# Patient Record
Sex: FEMALE | Race: WHITE | Employment: FULL TIME | URBAN - NONMETROPOLITAN AREA
[De-identification: names, ages, dates, MRNs, and addresses within clinical notes are randomized per-mention and may not be internally consistent; named-entity substitution may affect disease eponyms.]

---

## 2019-04-04 VITALS
DIASTOLIC BLOOD PRESSURE: 82 MMHG | WEIGHT: 163 LBS | SYSTOLIC BLOOD PRESSURE: 136 MMHG | HEIGHT: 64 IN | TEMPERATURE: 96.9 F | BODY MASS INDEX: 27.83 KG/M2

## 2019-04-04 RX ORDER — LANSOPRAZOLE 30 MG/1
1 CAPSULE, DELAYED RELEASE ORAL DAILY
COMMUNITY
Start: 2016-11-29 | End: 2020-07-13 | Stop reason: SDUPTHER

## 2019-04-04 SDOH — HEALTH STABILITY: MENTAL HEALTH: HOW OFTEN DO YOU HAVE A DRINK CONTAINING ALCOHOL?: NEVER

## 2019-09-16 RX ORDER — LANSOPRAZOLE 15 MG/1
15 CAPSULE, DELAYED RELEASE ORAL DAILY
Qty: 90 CAPSULE | Refills: 2 | Status: SHIPPED | OUTPATIENT
Start: 2019-09-16 | End: 2019-09-23 | Stop reason: SDUPTHER

## 2019-09-23 RX ORDER — LANSOPRAZOLE 15 MG/1
15 CAPSULE, DELAYED RELEASE ORAL 2 TIMES DAILY
Qty: 90 CAPSULE | Refills: 2 | Status: SHIPPED | OUTPATIENT
Start: 2019-09-23 | End: 2020-01-27 | Stop reason: SDUPTHER

## 2019-11-27 ENCOUNTER — TELEPHONE (OUTPATIENT)
Dept: FAMILY MEDICINE CLINIC | Age: 39
End: 2019-11-27

## 2019-11-27 RX ORDER — AZITHROMYCIN 200 MG/5ML
POWDER, FOR SUSPENSION ORAL
Qty: 38 ML | Refills: 0 | Status: SHIPPED | OUTPATIENT
Start: 2019-11-27 | End: 2021-08-24

## 2019-11-29 ENCOUNTER — TELEPHONE (OUTPATIENT)
Dept: PRIMARY CARE CLINIC | Age: 39
End: 2019-11-29

## 2019-11-29 RX ORDER — AMOXICILLIN 250 MG/5ML
POWDER, FOR SUSPENSION ORAL
Qty: 300 ML | Refills: 0 | Status: SHIPPED
Start: 2019-11-29 | End: 2020-03-19 | Stop reason: SDUPTHER

## 2020-01-27 RX ORDER — LANSOPRAZOLE 15 MG/1
15 CAPSULE, DELAYED RELEASE ORAL 2 TIMES DAILY
Qty: 90 CAPSULE | Refills: 2 | Status: SHIPPED
Start: 2020-01-27 | End: 2020-04-14 | Stop reason: SDUPTHER

## 2020-03-19 RX ORDER — AMOXICILLIN 250 MG/5ML
POWDER, FOR SUSPENSION ORAL
Qty: 300 ML | Refills: 0 | Status: SHIPPED | OUTPATIENT
Start: 2020-03-19 | End: 2021-08-24

## 2020-03-19 NOTE — TELEPHONE ENCOUNTER
Patient reports sinus infection. She has no other symptoms. Asking for LIQUID Amoxicillin to be called in. Please advise.       Electronically signed by Arias Hanson LPN on 5/06/00 at 69:67 PM EDT

## 2020-04-14 RX ORDER — LANSOPRAZOLE 15 MG/1
15 CAPSULE, DELAYED RELEASE ORAL 2 TIMES DAILY
Qty: 60 CAPSULE | Refills: 2 | Status: SHIPPED
Start: 2020-04-14 | End: 2020-09-24 | Stop reason: SDUPTHER

## 2020-07-13 ENCOUNTER — TELEPHONE (OUTPATIENT)
Dept: PRIMARY CARE CLINIC | Age: 40
End: 2020-07-13

## 2020-07-13 RX ORDER — LANSOPRAZOLE 15 MG/1
15 CAPSULE, DELAYED RELEASE ORAL DAILY
COMMUNITY
End: 2020-07-13 | Stop reason: SDUPTHER

## 2020-07-13 RX ORDER — LANSOPRAZOLE 30 MG/1
30 CAPSULE, DELAYED RELEASE ORAL DAILY
Qty: 30 CAPSULE | Refills: 5 | Status: SHIPPED
Start: 2020-07-13 | End: 2020-09-24 | Stop reason: SDUPTHER

## 2020-07-13 RX ORDER — LANSOPRAZOLE 15 MG/1
15 CAPSULE, DELAYED RELEASE ORAL DAILY
Qty: 30 CAPSULE | Refills: 5 | Status: SHIPPED
Start: 2020-07-13 | End: 2021-04-27 | Stop reason: SDUPTHER

## 2020-07-13 NOTE — TELEPHONE ENCOUNTER
Last Appointment:  Visit date not found  No future appointments. Patient called to request an increase in Prevacid. She would like to continue with 30 in morning and have new script for 15 mg at night. Pended 15 mg and refilled request for 30 mg.     Electronically signed by Gilson Welch LPN on 8/25/7607 at 42:29 AM

## 2020-08-24 ENCOUNTER — TELEPHONE (OUTPATIENT)
Dept: PRIMARY CARE CLINIC | Age: 40
End: 2020-08-24

## 2020-08-24 RX ORDER — AMOXICILLIN 250 MG/5ML
500 POWDER, FOR SUSPENSION ORAL 3 TIMES DAILY
Qty: 300 ML | Refills: 0 | Status: SHIPPED | OUTPATIENT
Start: 2020-08-24 | End: 2020-09-03

## 2020-08-24 NOTE — TELEPHONE ENCOUNTER
A prescription was sent in for liquid amoxicillin 250 per 5 mL 10 mL's 3 times daily x10 days. Thank you.

## 2020-09-24 ENCOUNTER — TELEPHONE (OUTPATIENT)
Dept: PRIMARY CARE CLINIC | Age: 40
End: 2020-09-24

## 2020-09-24 ENCOUNTER — OFFICE VISIT (OUTPATIENT)
Dept: PRIMARY CARE CLINIC | Age: 40
End: 2020-09-24
Payer: COMMERCIAL

## 2020-09-24 VITALS
DIASTOLIC BLOOD PRESSURE: 82 MMHG | WEIGHT: 153 LBS | BODY MASS INDEX: 26.12 KG/M2 | HEART RATE: 91 BPM | RESPIRATION RATE: 16 BRPM | SYSTOLIC BLOOD PRESSURE: 130 MMHG | OXYGEN SATURATION: 98 % | HEIGHT: 64 IN | TEMPERATURE: 97.2 F

## 2020-09-24 PROBLEM — K21.9 GASTROESOPHAGEAL REFLUX DISEASE: Status: ACTIVE | Noted: 2020-09-24

## 2020-09-24 PROCEDURE — 4004F PT TOBACCO SCREEN RCVD TLK: CPT | Performed by: FAMILY MEDICINE

## 2020-09-24 PROCEDURE — G8419 CALC BMI OUT NRM PARAM NOF/U: HCPCS | Performed by: FAMILY MEDICINE

## 2020-09-24 PROCEDURE — 99213 OFFICE O/P EST LOW 20 MIN: CPT | Performed by: FAMILY MEDICINE

## 2020-09-24 PROCEDURE — G8427 DOCREV CUR MEDS BY ELIG CLIN: HCPCS | Performed by: FAMILY MEDICINE

## 2020-09-24 RX ORDER — LANSOPRAZOLE 15 MG/1
15 CAPSULE, DELAYED RELEASE ORAL 2 TIMES DAILY
Qty: 60 CAPSULE | Refills: 5 | Status: SHIPPED
Start: 2020-09-24 | End: 2021-06-07 | Stop reason: ALTCHOICE

## 2020-09-24 RX ORDER — LANSOPRAZOLE 30 MG/1
30 CAPSULE, DELAYED RELEASE ORAL DAILY
Qty: 30 CAPSULE | Refills: 5 | Status: SHIPPED
Start: 2020-09-24 | End: 2021-03-10 | Stop reason: SDUPTHER

## 2020-09-24 ASSESSMENT — PATIENT HEALTH QUESTIONNAIRE - PHQ9
SUM OF ALL RESPONSES TO PHQ QUESTIONS 1-9: 0
SUM OF ALL RESPONSES TO PHQ9 QUESTIONS 1 & 2: 0
1. LITTLE INTEREST OR PLEASURE IN DOING THINGS: 0
2. FEELING DOWN, DEPRESSED OR HOPELESS: 0
SUM OF ALL RESPONSES TO PHQ QUESTIONS 1-9: 0
DEPRESSION UNABLE TO ASSESS: FUNCTIONAL CAPACITY MOTIVATION LIMITS ACCURACY

## 2020-09-24 NOTE — PROGRESS NOTES
2020     Erma Robins    : 1980 Sex: female   Age: 35 y.o. Chief Complaint   Patient presents with    Foot Pain     right foot pain- about a 1 to 1 1/2 week/s       HPI: This 35y.o. -year-old female  presents today for evaluation and management of her  chronic medical problems. Current medication list reviewed. The patient is tolerating all medications well without adverse events or known side effects. The patient does understand the risk and benefits of the prescribed medications. The patient is up-to-date on all age-appropriate wellness issues. Patient presents today with a 1 and half week history of right foot pain. Patient localizes the pain to the lateral dorsal aspect proximal in location. The patient also presents today with a palpable lump on the plantar aspect of the right middle toe. Patient states she noticed the lump approximately a year ago. ROS:   Const: Denies changes in appetite, chills, fever, night sweats and weight loss. Eyes:  Denies discharge, a recent change in visual acuity, blurred vision and double vision. ENMT: Denies discharge of the ears, hearing loss, pain of the ears. Denies nasal or sinus symptoms other than stated above. Denies mouth or throat symptoms. CV:  Denies chest pain, dyspnea on exertion, orthopnea, palpitations and PND  Resp: Denies chest pain, cough, SOB and wheezing. GI: Denies abdominal pain, constipation, diarrhea, heartburn, indigestion, nausea and vomiting. : Denies dysuria, frequency, hematuria, nocturia and urgency. Musculo: Denies arthralgias and myalgia  Skin:  Denies lesions, pruritus and rash. Neuro: Denies dizziness, lightheadedness, numbness, tingling and weakness. Psych:  Denies anxiety and depression  Endocrine: Denies anxiety and depression. Hema/Lymph: Denies hematologic symptoms  Allergy/Immuno:  Denies allergic/immunologic symptoms.   Pertinent positives reviewed and noted      Current Outpatient Medications:   lansoprazole (PREVACID) 30 MG delayed release capsule, Take 1 capsule by mouth daily, Disp: 30 capsule, Rfl: 5    lansoprazole (PREVACID) 15 MG delayed release capsule, Take 1 capsule by mouth 2 times daily, Disp: 60 capsule, Rfl: 5    lansoprazole (PREVACID) 15 MG delayed release capsule, Take 1 capsule by mouth daily, Disp: 30 capsule, Rfl: 5    amoxicillin (AMOXIL) 250 MG/5ML suspension, 10 mL's 3 times a day. (Patient not taking: Reported on 9/24/2020), Disp: 300 mL, Rfl: 0    azithromycin (ZITHROMAX) 200 MG/5ML suspension, Day 1 12.5 mL. Days 2 through 5 6.25 mL daily.  (Patient not taking: Reported on 9/24/2020), Disp: 38 mL, Rfl: 0    Allergies   Allergen Reactions    Amoxicillin-Pot Clavulanate     Nitrofurantoin Nausea Only       Past Medical History:   Diagnosis Date    Hypoglycemia 2005     Social History     Socioeconomic History    Marital status:      Spouse name: Not on file    Number of children: Not on file    Years of education: Not on file    Highest education level: Not on file   Occupational History    Not on file   Social Needs    Financial resource strain: Not on file    Food insecurity     Worry: Not on file     Inability: Not on file    Transportation needs     Medical: Not on file     Non-medical: Not on file   Tobacco Use    Smoking status: Current Every Day Smoker     Packs/day: 1.00     Types: Cigarettes     Start date: 9/24/1998    Smokeless tobacco: Never Used   Substance and Sexual Activity    Alcohol use: Never     Frequency: Never    Drug use: Not on file    Sexual activity: Not on file   Lifestyle    Physical activity     Days per week: Not on file     Minutes per session: Not on file    Stress: Not on file   Relationships    Social connections     Talks on phone: Not on file     Gets together: Not on file     Attends Taoist service: Not on file     Active member of club or organization: Not on file     Attends meetings of clubs or Per Assessment:  Shante Kelley was seen today for foot pain. Diagnoses and all orders for this visit:    Pain in right foot  -     XR FOOT RIGHT (MIN 3 VIEWS); Future    Gastroesophageal reflux disease, esophagitis presence not specified  -     lansoprazole (PREVACID) 30 MG delayed release capsule; Take 1 capsule by mouth daily    Other orders  -     lansoprazole (PREVACID) 15 MG delayed release capsule; Take 1 capsule by mouth 2 times daily        Return in about 4 weeks (around 10/22/2020) for Wellness examination. Candace Blanton MD    Note was generated with the assistance of voice recognition software. Document was reviewed however may contain grammatical errors.

## 2020-09-25 ENCOUNTER — TELEPHONE (OUTPATIENT)
Dept: PRIMARY CARE CLINIC | Age: 40
End: 2020-09-25

## 2020-10-23 ENCOUNTER — TELEPHONE (OUTPATIENT)
Dept: PRIMARY CARE CLINIC | Age: 40
End: 2020-10-23

## 2020-10-24 RX ORDER — AMOXICILLIN 250 MG/5ML
250 POWDER, FOR SUSPENSION ORAL 3 TIMES DAILY
Qty: 150 ML | Refills: 0 | Status: SHIPPED | OUTPATIENT
Start: 2020-10-24 | End: 2020-11-03

## 2021-03-10 DIAGNOSIS — K21.9 GASTROESOPHAGEAL REFLUX DISEASE: ICD-10-CM

## 2021-03-10 RX ORDER — LANSOPRAZOLE 15 MG/1
15 CAPSULE, DELAYED RELEASE ORAL DAILY
Qty: 30 CAPSULE | Refills: 5 | OUTPATIENT
Start: 2021-03-10

## 2021-03-10 RX ORDER — LANSOPRAZOLE 30 MG/1
30 CAPSULE, DELAYED RELEASE ORAL DAILY
Qty: 30 CAPSULE | Refills: 5 | Status: SHIPPED
Start: 2021-03-10 | End: 2021-06-07 | Stop reason: SDUPTHER

## 2021-04-27 DIAGNOSIS — K21.9 GASTROESOPHAGEAL REFLUX DISEASE: ICD-10-CM

## 2021-04-27 RX ORDER — LANSOPRAZOLE 15 MG/1
15 CAPSULE, DELAYED RELEASE ORAL DAILY
Qty: 30 CAPSULE | Refills: 5 | Status: SHIPPED
Start: 2021-04-27 | End: 2021-06-07 | Stop reason: ALTCHOICE

## 2021-06-07 DIAGNOSIS — K21.9 GASTROESOPHAGEAL REFLUX DISEASE: ICD-10-CM

## 2021-06-07 RX ORDER — LANSOPRAZOLE 30 MG/1
30 CAPSULE, DELAYED RELEASE ORAL DAILY
Qty: 30 CAPSULE | Refills: 5 | Status: SHIPPED
Start: 2021-06-07 | End: 2021-11-02

## 2021-08-24 ENCOUNTER — TELEPHONE (OUTPATIENT)
Dept: PRIMARY CARE CLINIC | Age: 41
End: 2021-08-24

## 2021-08-24 ENCOUNTER — OFFICE VISIT (OUTPATIENT)
Dept: FAMILY MEDICINE CLINIC | Age: 41
End: 2021-08-24
Payer: COMMERCIAL

## 2021-08-24 VITALS
HEART RATE: 92 BPM | TEMPERATURE: 97.8 F | WEIGHT: 150 LBS | RESPIRATION RATE: 16 BRPM | OXYGEN SATURATION: 98 % | SYSTOLIC BLOOD PRESSURE: 148 MMHG | DIASTOLIC BLOOD PRESSURE: 80 MMHG | BODY MASS INDEX: 25.75 KG/M2

## 2021-08-24 DIAGNOSIS — J01.20 ACUTE NON-RECURRENT ETHMOIDAL SINUSITIS: Primary | ICD-10-CM

## 2021-08-24 PROCEDURE — G8427 DOCREV CUR MEDS BY ELIG CLIN: HCPCS | Performed by: NURSE PRACTITIONER

## 2021-08-24 PROCEDURE — 4004F PT TOBACCO SCREEN RCVD TLK: CPT | Performed by: NURSE PRACTITIONER

## 2021-08-24 PROCEDURE — 99213 OFFICE O/P EST LOW 20 MIN: CPT | Performed by: NURSE PRACTITIONER

## 2021-08-24 PROCEDURE — G8419 CALC BMI OUT NRM PARAM NOF/U: HCPCS | Performed by: NURSE PRACTITIONER

## 2021-08-24 RX ORDER — AMOXICILLIN 400 MG/5ML
875 POWDER, FOR SUSPENSION ORAL 2 TIMES DAILY
Qty: 1 BOTTLE | Refills: 0 | Status: SHIPPED | OUTPATIENT
Start: 2021-08-24 | End: 2021-09-03

## 2021-08-24 RX ORDER — LANSOPRAZOLE 15 MG/1
CAPSULE, DELAYED RELEASE ORAL
COMMUNITY
Start: 2021-08-07 | End: 2021-11-02 | Stop reason: SDUPTHER

## 2021-08-24 NOTE — LETTER
[address]  Phone: [phone]  Fax: [address] [place], APRN - CNP        August 24, 2021     Patient: Mae Bunn   YOB: 1980   Date of Visit: 8/24/2021       To Whom it May Concern:    Yessenia Galindo was seen in my clinic on 8/24/2021. She may return to work on 8/28/21. If you have any questions or concerns, please don't hesitate to call.     Sincerely,         Pierre Estes, KAELA - CNP

## 2021-08-24 NOTE — PATIENT INSTRUCTIONS
Patient Education        Sinusitis: Care Instructions  Your Care Instructions     Sinusitis is an infection of the lining of the sinus cavities in your head. Sinusitis often follows a cold. It causes pain and pressure in your head and face. In most cases, sinusitis gets better on its own in 1 to 2 weeks. But some mild symptoms may last for several weeks. Sometimes antibiotics are needed. Follow-up care is a key part of your treatment and safety. Be sure to make and go to all appointments, and call your doctor if you are having problems. It's also a good idea to know your test results and keep a list of the medicines you take. How can you care for yourself at home? · Take an over-the-counter pain medicine, such as acetaminophen (Tylenol), ibuprofen (Advil, Motrin), or naproxen (Aleve). Read and follow all instructions on the label. · If the doctor prescribed antibiotics, take them as directed. Do not stop taking them just because you feel better. You need to take the full course of antibiotics. · Be careful when taking over-the-counter cold or flu medicines and Tylenol at the same time. Many of these medicines have acetaminophen, which is Tylenol. Read the labels to make sure that you are not taking more than the recommended dose. Too much acetaminophen (Tylenol) can be harmful. · Breathe warm, moist air from a steamy shower, a hot bath, or a sink filled with hot water. Avoid cold, dry air. Using a humidifier in your home may help. Follow the directions for cleaning the machine. · Use saline (saltwater) nasal washes. This can help keep your nasal passages open and wash out mucus and bacteria. You can buy saline nose drops at a grocery store or drugstore. Or you can make your own at home by adding 1 teaspoon of salt and 1 teaspoon of baking soda to 2 cups of distilled water. If you make your own, fill a bulb syringe with the solution, insert the tip into your nostril, and squeeze gently.  Humble Lints your nose.  · Put a hot, wet towel or a warm gel pack on your face 3 or 4 times a day for 5 to 10 minutes each time. · Try a decongestant nasal spray like oxymetazoline (Afrin). Do not use it for more than 3 days in a row. Using it for more than 3 days can make your congestion worse. When should you call for help? Call your doctor now or seek immediate medical care if:    · You have new or worse swelling or redness in your face or around your eyes.     · You have a new or higher fever. Watch closely for changes in your health, and be sure to contact your doctor if:    · You have new or worse facial pain.     · The mucus from your nose becomes thicker (like pus) or has new blood in it.     · You are not getting better as expected. Where can you learn more? Go to [email or web address]. org and sign in to your [employer] account. Enter B350 in the CONWEAVER box to learn more about \"Sinusitis: Care Instructions. \"     If you do not have an account, please click on the \"Sign Up Now\" link. Current as of: December 2, 2020               Content Version: 12.9  © 2006-2021 Healthwise, [facility]. Care instructions adapted under license by [facility] ([facility]). If you have questions about a medical condition or this instruction, always ask your healthcare professional. [address] any warranty or liability for your use of this information.

## 2021-08-24 NOTE — TELEPHONE ENCOUNTER
----- Message from [facility] sent at 8/24/2021  9:27 AM EDT -----  Subject: Appointment Request    Reason for Call: Urgent Cough Cold    QUESTIONS  Type of Appointment? Established Patient  Reason for appointment request? No appointments available during search  Additional Information for Provider? Pt was a pt of Dr. Lynnette Bejarano and   received a letter about establishing care with a female Dr. Odilia killian who   the Dr was and no one is coming up with the search. Pt has a sinus   infection and was looking to be seen. Pt also had a covid test and it came   back negative. Pt need a letter to go back to work saying she is ok.  ---------------------------------------------------------------------------  --------------  Yazmin Joe Sar[employer] Tech  What is the best way for the office to contact you? OK to leave message on   voicemail  Preferred Call Back Phone Number? [phone]  ---------------------------------------------------------------------------  --------------  SCRIPT ANSWERS  Relationship to Patient? Self  Specialty Confirmation? Primary Care  Are you currently unable to finish sentences due to any difficulty   breathing? No  Are you unable to swallow liquids? No  Are you having fevers (100.4 or greater), chills, or sweats? No  Do you have COPD, asthma or a chronic lung condition? No  Have your symptoms been present for more than 5 days? Yes   Have you been diagnosed with, awaiting test results for, or told that you   are suspected of having COVID-19 (Coronavirus)? (If patient has tested   negative or was tested as a requirement for work, school, or travel and   not based on symptoms, answer no)? No  Do you currently have flu-like symptoms including fever or chills, cough,   shortness of breath, difficulty breathing, or new loss of taste or smell? No  Have you had close contact with someone with COVID-19 in the last 14 days?    Yes

## 2021-08-24 NOTE — PROGRESS NOTES
21  Eboni Knapp : 1980 Sex: female  Age 38 y.o. Subjective:  Chief Complaint   Patient presents with    Sinus Problem    Other     exposed last saturday. negarive covid yesterday        HPI:   Eboni Knapp , 38 y.o. female presents to the clinic for evaluation of sinus congestion x 14 days. The patient also reports intermittent headache. The patient has not taken any treatment for symptoms. The patient reports unchanged symptoms over time. The patient reports COVID-19 exposure 10 days. The patient reports negative COVID-19 test yesterday at [facility]. The patient denies hx of COVID-19 and vaccines. The patient denies acute loss of taste and smell, headache, cough, sore throat, rash, and fever. The patient also denies chest pain, abdominal pain, shortness of breath, and nausea / vomiting / diarrhea. ROS:   Unless otherwise stated in this report the patient's positive and negative responses for review of systems for constitutional, eyes, ENT, cardiovascular, respiratory, gastrointestinal, neurological, , musculoskeletal, and integument systems and related systems to the presenting problem are either stated in the history of present illness or were not pertinent or were negative for the symptoms and/or complaints related to the presenting medical problem. Positives and pertinent negatives as per HPI. All others reviewed and are negative.       PMH:     Past Medical History:   Diagnosis Date    Hypoglycemia        Past Surgical History:   Procedure Laterality Date    MYRINGOTOMY AND TYMPANOSTOMY TUBE PLACEMENT Bilateral     TONSILLECTOMY AND ADENOIDECTOMY         Family History   Problem Relation Age of Onset    Coronary Art Dis Mother     Diabetes Mother     High Blood Pressure Mother     Schizophrenia Father        Medications:     Current Outpatient Medications:     lansoprazole (PREVACID) 15 MG delayed release capsule, TAKE 1 CAPSULE BY MOUTH DAILY, Disp: , Rfl:     amoxicillin (AMOXIL) 400 MG/5ML suspension, Take 10.9 mLs by mouth 2 times daily for 10 days, Disp: 1 Bottle, Rfl: 0    lansoprazole (PREVACID) 30 MG delayed release capsule, Take 1 capsule by mouth daily, Disp: 30 capsule, Rfl: 5    Allergies: Allergies   Allergen Reactions    Amoxicillin-Pot Clavulanate     Nitrofurantoin Nausea Only       Social History:     Social History     Tobacco Use    Smoking status: Current Every Day Smoker     Packs/day: 1.00     Types: Cigarettes     Start date: 9/24/1998    Smokeless tobacco: Never Used   Substance Use Topics    Alcohol use: Never    Drug use: Not on file       Patient lives at home. Physical Exam:     Vitals:    08/24/21 1300 08/24/21 1303   BP: (!) 148/80 (!) 148/80   Pulse: 92    Resp: 16    Temp: 97.8 °F (36.6 °C)    TempSrc: Temporal    SpO2: 98%    Weight: 150 lb (68 kg)        Physical Exam (PE)    Physical Exam  Constitutional:       Appearance: Normal appearance. HENT:      Head: Normocephalic. Comments: Positive bilateral ethmoid sinus TTP. Right Ear: Tympanic membrane, ear canal and external ear normal.      Left Ear: Tympanic membrane, ear canal and external ear normal.      Nose: Congestion and rhinorrhea present. Mouth/Throat:      Mouth: Mucous membranes are moist.      Pharynx: Oropharynx is clear. Eyes:      Pupils: Pupils are equal, round, and reactive to light. Cardiovascular:      Rate and Rhythm: Normal rate and regular rhythm. Pulses: Normal pulses. Heart sounds: Normal heart sounds. Pulmonary:      Effort: Pulmonary effort is normal.      Breath sounds: Normal breath sounds. Abdominal:      General: Bowel sounds are normal.      Palpations: Abdomen is soft. Musculoskeletal:         General: Normal range of motion. Cervical back: Normal range of motion and neck supple. Lymphadenopathy:      Cervical: No cervical adenopathy. Skin:     General: Skin is warm and dry.       Capillary Refill: Capillary refill takes less than 2 seconds. Neurological:      General: No focal deficit present. Mental Status: She is alert and oriented to person, place, and time. Psychiatric:         Mood and Affect: Mood normal.         Behavior: Behavior normal.          Testing:   (All laboratory and radiology results have been personally reviewed by myself)  Labs:  No results found for this visit on 08/24/21. Imaging: All Radiology results interpreted by Radiologist unless otherwise noted. No orders to display       Assessment / Plan:   The patient's vitals, allergies, medications, and past medical history have been reviewed. Abril Becker was seen today for sinus problem and other. Diagnoses and all orders for this visit:    Acute non-recurrent ethmoidal sinusitis  -     amoxicillin (AMOXIL) 400 MG/5ML suspension; Take 10.9 mLs by mouth 2 times daily for 10 days        - Disposition: Home    - Educational material printed for patient's review and were included in patient instructions. After Visit Summary and given to patient at the end of visit. - Encouraged oral fluids and rest. Discussed symptomatic treatments with patient today including Tylenol prn for fever / pain. Schedule a follow-up with PCP in 2-3 days. Red flag symptoms were discussed with the patient today. The patient is directed to go the ED if symptoms change or worsen. Pt verbalizes understanding and is in agreement with plan of care. All questions answered. SIGNATURE: KAELA Bunrs-CNP    *NOTE: This report was transcribed using voice recognition software. Every effort was made to ensure accuracy; however, inadvertent computerized transcription errors may be present.

## 2021-10-04 ENCOUNTER — OFFICE VISIT (OUTPATIENT)
Dept: FAMILY MEDICINE CLINIC | Age: 41
End: 2021-10-04
Payer: COMMERCIAL

## 2021-10-04 VITALS
HEART RATE: 93 BPM | BODY MASS INDEX: 25.61 KG/M2 | HEIGHT: 64 IN | SYSTOLIC BLOOD PRESSURE: 120 MMHG | DIASTOLIC BLOOD PRESSURE: 70 MMHG | WEIGHT: 150 LBS | RESPIRATION RATE: 16 BRPM | TEMPERATURE: 97.7 F

## 2021-10-04 DIAGNOSIS — J06.9 VIRAL URI: Primary | ICD-10-CM

## 2021-10-04 PROCEDURE — 99213 OFFICE O/P EST LOW 20 MIN: CPT | Performed by: STUDENT IN AN ORGANIZED HEALTH CARE EDUCATION/TRAINING PROGRAM

## 2021-10-04 PROCEDURE — G8419 CALC BMI OUT NRM PARAM NOF/U: HCPCS | Performed by: STUDENT IN AN ORGANIZED HEALTH CARE EDUCATION/TRAINING PROGRAM

## 2021-10-04 PROCEDURE — G8484 FLU IMMUNIZE NO ADMIN: HCPCS | Performed by: STUDENT IN AN ORGANIZED HEALTH CARE EDUCATION/TRAINING PROGRAM

## 2021-10-04 PROCEDURE — G8427 DOCREV CUR MEDS BY ELIG CLIN: HCPCS | Performed by: STUDENT IN AN ORGANIZED HEALTH CARE EDUCATION/TRAINING PROGRAM

## 2021-10-04 PROCEDURE — 4004F PT TOBACCO SCREEN RCVD TLK: CPT | Performed by: STUDENT IN AN ORGANIZED HEALTH CARE EDUCATION/TRAINING PROGRAM

## 2021-10-04 ASSESSMENT — ENCOUNTER SYMPTOMS
NAUSEA: 0
RHINORRHEA: 0
COUGH: 1
DIARRHEA: 0
SORE THROAT: 1
SHORTNESS OF BREATH: 0
VOMITING: 0

## 2021-10-04 NOTE — PROGRESS NOTES
Musculoskeletal: Positive for neck pain. Negative for arthralgias and myalgias. Skin: Negative for rash. Allergic/Immunologic: Positive for environmental allergies. Neurological: Positive for headaches. Negative for light-headedness. Medical History:     Patient Active Problem List   Diagnosis    Gastroesophageal reflux disease        Past Medical History:   Diagnosis Date    Hypoglycemia 2005       Past Surgical History:   Procedure Laterality Date    MYRINGOTOMY AND TYMPANOSTOMY TUBE PLACEMENT Bilateral     TONSILLECTOMY AND ADENOIDECTOMY         Family History   Problem Relation Age of Onset    Coronary Art Dis Mother     Diabetes Mother     High Blood Pressure Mother     Schizophrenia Father        Medications:     Current Outpatient Medications:     lansoprazole (PREVACID) 15 MG delayed release capsule, TAKE 1 CAPSULE BY MOUTH DAILY, Disp: , Rfl:     lansoprazole (PREVACID) 30 MG delayed release capsule, Take 1 capsule by mouth daily, Disp: 30 capsule, Rfl: 5    Allergies:      Allergies   Allergen Reactions    Amoxicillin-Pot Clavulanate     Nitrofurantoin Nausea Only       Social History:     Social History     Socioeconomic History    Marital status:      Spouse name: Not on file    Number of children: Not on file    Years of education: Not on file    Highest education level: Not on file   Occupational History    Not on file   Tobacco Use    Smoking status: Current Every Day Smoker     Packs/day: 1.00     Types: Cigarettes     Start date: 9/24/1998    Smokeless tobacco: Never Used   Substance and Sexual Activity    Alcohol use: Never    Drug use: Not on file    Sexual activity: Not on file   Other Topics Concern    Not on file   Social History Narrative    Not on file     Social Determinants of Health     Financial Resource Strain:     Difficulty of Paying Living Expenses:    Food Insecurity:     Worried About Running Out of Food in the Last Year:     Deven roberts Food in the Last Year:    Transportation Needs:     Lack of Transportation (Medical):  Lack of Transportation (Non-Medical):    Physical Activity:     Days of Exercise per Week:     Minutes of Exercise per Session:    Stress:     Feeling of Stress :    Social Connections:     Frequency of Communication with Friends and Family:     Frequency of Social Gatherings with Friends and Family:     Attends Rastafari Services:     Active Member of Clubs or Organizations:     Attends Club or Organization Meetings:     Marital Status:    Intimate Partner Violence:     Fear of Current or Ex-Partner:     Emotionally Abused:     Physically Abused:     Sexually Abused:        Physical Exam:     Vitals:    10/04/21 1130   BP: 120/70   Pulse: 93   Resp: 16   Temp: 97.7 °F (36.5 °C)   TempSrc: Temporal   Weight: 150 lb (68 kg)   Height: 5' 4\" (1.626 m)        Physical Exam  Vitals and nursing note reviewed. Constitutional:       General: She is not in acute distress. Appearance: Normal appearance. She is not ill-appearing or diaphoretic. HENT:      Right Ear: Tympanic membrane, ear canal and external ear normal.      Left Ear: Tympanic membrane, ear canal and external ear normal.      Nose: Mucosal edema and congestion present. No rhinorrhea. Right Turbinates: Enlarged. Left Turbinates: Enlarged. Right Sinus: No maxillary sinus tenderness or frontal sinus tenderness. Left Sinus: No maxillary sinus tenderness or frontal sinus tenderness. Mouth/Throat:      Mouth: Mucous membranes are moist.      Pharynx: Oropharynx is clear. No oropharyngeal exudate or posterior oropharyngeal erythema. Eyes:      Extraocular Movements: Extraocular movements intact. Conjunctiva/sclera: Conjunctivae normal.   Cardiovascular:      Rate and Rhythm: Normal rate and regular rhythm. Pulses: Normal pulses. Heart sounds: Normal heart sounds.    Pulmonary:      Effort: Pulmonary effort is normal. No respiratory distress. Breath sounds: Normal breath sounds. No wheezing, rhonchi or rales. Musculoskeletal:      Cervical back: Normal range of motion and neck supple. Lymphadenopathy:      Cervical: Cervical adenopathy present. Skin:     General: Skin is warm and dry. Capillary Refill: Capillary refill takes less than 2 seconds. Neurological:      Mental Status: She is alert and oriented to person, place, and time. Testing:   No orders of the defined types were placed in this encounter. No results found for this or any previous visit (from the past 24 hour(s)).

## 2022-02-04 ENCOUNTER — OFFICE VISIT (OUTPATIENT)
Dept: PRIMARY CARE CLINIC | Age: 42
End: 2022-02-04
Payer: COMMERCIAL

## 2022-02-04 VITALS
OXYGEN SATURATION: 97 % | DIASTOLIC BLOOD PRESSURE: 70 MMHG | SYSTOLIC BLOOD PRESSURE: 118 MMHG | HEART RATE: 116 BPM | WEIGHT: 150 LBS | TEMPERATURE: 96.5 F | RESPIRATION RATE: 16 BRPM | BODY MASS INDEX: 25.61 KG/M2 | HEIGHT: 64 IN

## 2022-02-04 DIAGNOSIS — B97.89 VIRAL RESPIRATORY ILLNESS: ICD-10-CM

## 2022-02-04 DIAGNOSIS — K21.9 GASTROESOPHAGEAL REFLUX DISEASE, UNSPECIFIED WHETHER ESOPHAGITIS PRESENT: ICD-10-CM

## 2022-02-04 DIAGNOSIS — J98.8 VIRAL RESPIRATORY ILLNESS: ICD-10-CM

## 2022-02-04 DIAGNOSIS — F17.200 TOBACCO USE DISORDER: ICD-10-CM

## 2022-02-04 DIAGNOSIS — Z76.89 ENCOUNTER TO ESTABLISH CARE WITH NEW DOCTOR: Primary | ICD-10-CM

## 2022-02-04 DIAGNOSIS — E66.3 OVERWEIGHT (BMI 25.0-29.9): ICD-10-CM

## 2022-02-04 LAB
Lab: NORMAL
QC PASS/FAIL: NORMAL
SARS-COV-2 RDRP RESP QL NAA+PROBE: NEGATIVE

## 2022-02-04 PROCEDURE — 99214 OFFICE O/P EST MOD 30 MIN: CPT | Performed by: STUDENT IN AN ORGANIZED HEALTH CARE EDUCATION/TRAINING PROGRAM

## 2022-02-04 PROCEDURE — G8484 FLU IMMUNIZE NO ADMIN: HCPCS | Performed by: STUDENT IN AN ORGANIZED HEALTH CARE EDUCATION/TRAINING PROGRAM

## 2022-02-04 PROCEDURE — G8419 CALC BMI OUT NRM PARAM NOF/U: HCPCS | Performed by: STUDENT IN AN ORGANIZED HEALTH CARE EDUCATION/TRAINING PROGRAM

## 2022-02-04 PROCEDURE — G8427 DOCREV CUR MEDS BY ELIG CLIN: HCPCS | Performed by: STUDENT IN AN ORGANIZED HEALTH CARE EDUCATION/TRAINING PROGRAM

## 2022-02-04 PROCEDURE — 4004F PT TOBACCO SCREEN RCVD TLK: CPT | Performed by: STUDENT IN AN ORGANIZED HEALTH CARE EDUCATION/TRAINING PROGRAM

## 2022-02-04 PROCEDURE — 87635 SARS-COV-2 COVID-19 AMP PRB: CPT | Performed by: STUDENT IN AN ORGANIZED HEALTH CARE EDUCATION/TRAINING PROGRAM

## 2022-02-04 RX ORDER — LANSOPRAZOLE 30 MG/1
30 CAPSULE, DELAYED RELEASE ORAL DAILY
Qty: 90 CAPSULE | Refills: 1 | Status: SHIPPED
Start: 2022-02-04 | End: 2022-05-16 | Stop reason: SDUPTHER

## 2022-02-04 RX ORDER — ECHINACEA PURPUREA EXTRACT 125 MG
1 TABLET ORAL PRN
Qty: 1 EACH | Refills: 0 | Status: SHIPPED
Start: 2022-02-04 | End: 2022-09-21

## 2022-02-04 ASSESSMENT — ENCOUNTER SYMPTOMS
SHORTNESS OF BREATH: 0
SORE THROAT: 1
VOMITING: 0
RHINORRHEA: 0
SINUS PRESSURE: 1
DIARRHEA: 0
COUGH: 1
NAUSEA: 1
SINUS PAIN: 0

## 2022-02-04 ASSESSMENT — PATIENT HEALTH QUESTIONNAIRE - PHQ9
1. LITTLE INTEREST OR PLEASURE IN DOING THINGS: 0
DEPRESSION UNABLE TO ASSESS: FUNCTIONAL CAPACITY MOTIVATION LIMITS ACCURACY
SUM OF ALL RESPONSES TO PHQ QUESTIONS 1-9: 0
SUM OF ALL RESPONSES TO PHQ9 QUESTIONS 1 & 2: 0
2. FEELING DOWN, DEPRESSED OR HOPELESS: 0
SUM OF ALL RESPONSES TO PHQ QUESTIONS 1-9: 0

## 2022-02-04 NOTE — PROGRESS NOTES
NEW PRIMARY CARE VISIT    22  Name: Samantha Castro   : 1980   Age: 42 y.o. Sex: female        Assessment & Plan:     Problem List Items Addressed This Visit        Digestive    Gastroesophageal reflux disease     Continue PPI         Relevant Medications    lansoprazole (PREVACID) 30 MG delayed release capsule       Other    Overweight (BMI 25.0-29. 9)     Screening labs next visit         Tobacco use disorder     1ppd  Pre-contemplative stages  Encouraged cessation           Other Visit Diagnoses     Encounter to establish care with new doctor    -  Primary    History reviewed and updated    Viral respiratory illness        COVID testing negative  Discussed supportive care, return precautions for viral illness    Relevant Medications    sodium chloride (ALTAMIST SPRAY) 0.65 % nasal spray    Other Relevant Orders    POCT COVID-19 Rapid, NAAT (Completed)          Counseled patient regarding above diagnosis, including possible risks and complications, especially if left uncontrolled. Counseled patient as appropriate and relevant regarding any possible side effects, risks, and alternatives to treatment; the patient verbalizes understanding, and is in agreement with the plan as detailed above. All educational materials and instructions were discussed and included on the After Visit Summary. All questions answered to the patient's satisfaction. The patient was advised to call or send Flaviar message for any concerns prior to next appointment. Return in about 4 weeks (around 3/4/2022). This provider and patient were wearing surgical masks and practiced social distancing when appropriate during visit due to COVID-19 pandemic. Subjective:     Chief Complaint   Patient presents with   [facility] Establ[facility] Care       Patient needs new PCP. Reports headache and sinus pressure for 5 days. Then cough, sore throat, and body aches for 3 days. Took home test 2 days ago, was negative.  Then took another test today, thinks it had a faint line. Has been around several sick people at work, several positive for Trevin. Review of Systems   Constitutional: Positive for chills. Negative for fever. HENT: Positive for congestion, sinus pressure and sore throat. Negative for rhinorrhea and sinus pain. Respiratory: Positive for cough. Negative for shortness of breath. Cardiovascular: Negative for chest pain. Gastrointestinal: Positive for nausea. Negative for diarrhea and vomiting. Musculoskeletal: Positive for arthralgias and myalgias. Skin: Negative for rash. Neurological: Positive for headaches. Negative for light-headedness. Medical History:   History reviewed and updated as needed.     Patient Active Problem List   Diagnosis    Gastroesophageal reflux disease    Low back pain    Tobacco use disorder        Past Medical History:   Diagnosis Date    Alcohol dependence ([facility] [address]     in remission    Dysphagia     Hypoglycemia 2005       Past Surgical History:   Procedure Laterality Date    CAPSULE ENDOSCOPY      MYRINGOTOMY AND TYMPANOSTOMY TUBE PLACEMENT Bilateral     TONSILLECTOMY AND ADENOIDECTOMY      UPPER GASTROINTESTINAL ENDOSCOPY         Family History   Problem Relation Age of Onset    Coronary Art Dis Mother     Diabetes Mother     High Blood Pressure Mother     Lung Cancer Mother     Schizophrenia Father     Bipolar Disorder Father     Bipolar Disorder Sister     Anxiety Disorder Brother     Stroke Maternal Grandmother     Emphysema Paternal Grandmother     Depression Sister     Depression Sister     Anxiety Disorder Sister     Clotting Disorder Sister         factor V heterozygous    Diabetes Sister     High Cholesterol Sister     Hypertension Sister     No Known Problems Brother     No Known Problems Son        Medications:     Current Outpatient Medications:     lansoprazole (PREVACID) 30 MG delayed release capsule, Take 1 capsule by mouth daily, Disp: 80 capsule, Rfl: 1    Allergies: Allergies   Allergen Reactions    Amoxicillin-Pot Clavulanate      Abdominal pain    Nitrofurantoin Nausea Only       Social History:     Social History     Socioeconomic History    Marital status:      Spouse name: Not on file    Number of children: Not on file    Years of education: Not on file    Highest education level: Not on file   Occupational History    Not on file   Tobacco Use    Smoking status: Current Every Day Smoker     Packs/day: 1.00     Types: Cigarettes     Start date: 9/24/1998    Smokeless tobacco: Never Used   Substance and Sexual Activity    Alcohol use: Yes     Comment: 2x monthly    Drug use: Never    Sexual activity: Yes     Partners: Male     Comment:    Other Topics Concern    Not on file   Social History Narrative    Not on file     Social Determinants of Health     Financial Resource Strain:     Difficulty of Paying Living Expenses: Not on file   Food Insecurity:     Worried About Running Out of Food in the Last Year: Not on file    Deven of Food in the Last Year: Not on file   Transportation Needs:     Lack of Transportation (Medical): Not on file    Lack of Transportation (Non-Medical):  Not on file   Physical Activity:     Days of Exercise per Week: Not on file    Minutes of Exercise per Session: Not on file   Stress:     Feeling of Stress : Not on file   Social Connections:     Frequency of Communication with Friends and Family: Not on file    Frequency of Social Gatherings with Friends and Family: Not on file    Attends Cheondoism Services: Not on file    Active Member of Clubs or Organizations: Not on file    Attends Club or Organization Meetings: Not on file    Marital Status: Not on file   Intimate Partner Violence:     Fear of Current or Ex-Partner: Not on file    Emotionally Abused: Not on file    Physically Abused: Not on file    Sexually Abused: Not on file   Housing Stability:     Unable to Pay for Housing in the Last Year: Not on file    Number of Places Lived in the Last Year: Not on file    Unstable Housing in the Last Year: Not on file       Physical Exam:     Vitals:    02/04/22 1552   BP: 118/70   Pulse: 116   Resp: 16   Temp: 96.5 °F (35.8 °C)   TempSrc: Temporal   SpO2: 97%   Weight: 150 lb (68 kg)   Height: 5' 4\" (1.626 m)       BP Readings from Last 3 Encounters:   02/04/22 118/70   10/04/21 120/70   08/24/21 (!) 148/80       Wt Readings from Last 3 Encounters:   02/04/22 150 lb (68 kg)   10/04/21 150 lb (68 kg)   08/24/21 150 lb (68 kg)        Physical Exam  Vitals and nursing note reviewed. Constitutional:       General: She is not in acute distress. Appearance: Normal appearance. She is overweight. She is not ill-appearing or diaphoretic. HENT:      Right Ear: Ear canal and external ear normal. No middle ear effusion. Tympanic membrane is bulging. Tympanic membrane is not injected or erythematous. Left Ear: Ear canal and external ear normal.  No middle ear effusion. Tympanic membrane is bulging. Tympanic membrane is not injected or erythematous. Nose: Congestion present. No rhinorrhea. Mouth/Throat:      Mouth: Mucous membranes are moist.      Pharynx: Oropharynx is clear. Posterior oropharyngeal erythema present. No oropharyngeal exudate. Tonsils: No tonsillar exudate or tonsillar abscesses. 0 on the right. 0 on the left. Eyes:      Extraocular Movements: Extraocular movements intact. Conjunctiva/sclera: Conjunctivae normal.   Cardiovascular:      Rate and Rhythm: Normal rate and regular rhythm. Heart sounds: Normal heart sounds. Pulmonary:      Effort: Pulmonary effort is normal. No respiratory distress. Breath sounds: Normal breath sounds. No wheezing, rhonchi or rales. Skin:     General: Skin is warm and dry. Neurological:      Mental Status: She is alert and oriented to person, place, and time.          Testing:     Orders Placed This Encounter   Procedures    POCT COVID-19 Rapid, NAAT     Order Specific Question:   Is this test for diagnosis or screening? Answer:   Diagnosis of ill patient     Order Specific Question:   Symptomatic for COVID-19 as defined by CDC? Answer:   Yes     Order Specific Question:   Date of Symptom Onset     Answer:   1/29/2022     Order Specific Question:   Hospitalized for COVID-19? Answer:   No     Order Specific Question:   Admitted to ICU for COVID-19? Answer:   No     Order Specific Question:   Employed in healthcare setting? Answer:   No     Order Specific Question:   Resident in a congregate (group) care setting? Answer:   No     Order Specific Question:   Pregnant? Answer:   Unknown     Order Specific Question:   Previously tested for COVID-19?      Answer:   Yes        Recent Results    POCT COVID-19 Rapid, NAAT    Collection Time: 02/04/22  5:12 PM   Result Value Ref Range    SARS-COV-2, RdRp gene Negative Negative    Lot Number [id number]     QC Pass/Fail pass

## 2022-02-09 ENCOUNTER — OFFICE VISIT (OUTPATIENT)
Dept: FAMILY MEDICINE CLINIC | Age: 42
End: 2022-02-09
Payer: COMMERCIAL

## 2022-02-09 VITALS
DIASTOLIC BLOOD PRESSURE: 80 MMHG | TEMPERATURE: 97.8 F | HEIGHT: 64 IN | BODY MASS INDEX: 25.1 KG/M2 | WEIGHT: 147 LBS | HEART RATE: 95 BPM | SYSTOLIC BLOOD PRESSURE: 142 MMHG | OXYGEN SATURATION: 98 % | RESPIRATION RATE: 18 BRPM

## 2022-02-09 DIAGNOSIS — R05.9 COUGH: Primary | ICD-10-CM

## 2022-02-09 DIAGNOSIS — J40 BRONCHITIS: ICD-10-CM

## 2022-02-09 PROCEDURE — 99203 OFFICE O/P NEW LOW 30 MIN: CPT | Performed by: PHYSICIAN ASSISTANT

## 2022-02-09 PROCEDURE — G8427 DOCREV CUR MEDS BY ELIG CLIN: HCPCS | Performed by: PHYSICIAN ASSISTANT

## 2022-02-09 PROCEDURE — G8419 CALC BMI OUT NRM PARAM NOF/U: HCPCS | Performed by: PHYSICIAN ASSISTANT

## 2022-02-09 PROCEDURE — 4004F PT TOBACCO SCREEN RCVD TLK: CPT | Performed by: PHYSICIAN ASSISTANT

## 2022-02-09 PROCEDURE — G8484 FLU IMMUNIZE NO ADMIN: HCPCS | Performed by: PHYSICIAN ASSISTANT

## 2022-02-09 RX ORDER — AZITHROMYCIN 200 MG/5ML
POWDER, FOR SUSPENSION ORAL
Qty: 40 ML | Refills: 0 | Status: SHIPPED
Start: 2022-02-09 | End: 2022-05-16 | Stop reason: ALTCHOICE

## 2022-02-09 RX ORDER — PREDNISOLONE 15 MG/5ML
40 SOLUTION ORAL DAILY
Qty: 66.5 ML | Refills: 0 | Status: SHIPPED | OUTPATIENT
Start: 2022-02-09 | End: 2022-02-14

## 2022-02-09 RX ORDER — ALBUTEROL SULFATE 90 UG/1
2 AEROSOL, METERED RESPIRATORY (INHALATION) 4 TIMES DAILY PRN
Qty: 18 G | Refills: 0 | Status: SHIPPED
Start: 2022-02-09 | End: 2022-05-16 | Stop reason: ALTCHOICE

## 2022-02-09 ASSESSMENT — ENCOUNTER SYMPTOMS
NAUSEA: 0
SHORTNESS OF BREATH: 0
ABDOMINAL PAIN: 0
SORE THROAT: 0
DIARRHEA: 0
VOMITING: 0
COUGH: 1
BACK PAIN: 0
PHOTOPHOBIA: 0

## 2022-02-09 NOTE — LETTER
[facility]  [address] [place] [address]  Phone: [phone]  Fax: [address] [place], [place]        February 9, 2022     Patient: Adriana Anguiano   YOB: 1980   Date of Visit: 2/9/2022       To Whom it May Concern:    Yash Anderson was seen in my clinic on 2/9/2022. She may return to work on 2/10/2022. If you have any questions or concerns, please don't hesitate to call.     Sincerely,         SARAN MADISON

## 2022-02-09 NOTE — PROGRESS NOTES
22  Bernarda Meyers : 1980 Sex: female  Age 42 y.o. Subjective:  Chief Complaint   Patient presents with    Cough    Congestion         40-year-old female with a history of GERD, tobacco use disorder and low back pain presents to the walk-in clinic for evaluation of cough and congestion. Patient states that she was here on Friday for her physical and states she was having symptoms at that time. She was not tested at that time but states that this morning she did take an at home COVID-19 test and it was negative. Patient states her cough is worsened. She does have some sputum production and states it is brown and yellow in color. She is a current smoker. She denies fever chills. No nausea or vomiting. No shortness breath or chest pain. No hemoptysis. She is not vaccinated for COVID-19. She denies any known sick contacts. She is not taking any over-the-counter medications. She denies chance of pregnancy. States her last menstrual cycle was 10 days ago. Review of Systems   Constitutional: Negative for chills and fever. HENT: Positive for congestion. Negative for ear pain and sore throat. Eyes: Negative for photophobia and visual disturbance. Respiratory: Positive for cough. Negative for shortness of breath. Cardiovascular: Negative for chest pain. Gastrointestinal: Negative for abdominal pain, diarrhea, nausea and vomiting. Genitourinary: Negative for difficulty urinating, dysuria, frequency and urgency. Musculoskeletal: Negative for back pain, neck pain and neck stiffness. Skin: Negative for rash. Neurological: Negative for dizziness, syncope, weakness, light-headedness and headaches. Hematological: Negative for adenopathy. Does not bruise/bleed easily. Psychiatric/Behavioral: Negative for agitation and confusion. All other systems reviewed and are negative.         PMH:     Past Medical History:   Diagnosis Date    Alcohol dependence ([facility] [address]     in remission    Dysphagia     Hypoglycemia 2005       Past Surgical History:   Procedure Laterality Date    CAPSULE ENDOSCOPY      MYRINGOTOMY AND TYMPANOSTOMY TUBE PLACEMENT Bilateral     TONSILLECTOMY AND ADENOIDECTOMY      UPPER GASTROINTESTINAL ENDOSCOPY         Family History   Problem Relation Age of Onset    Coronary Art Dis Mother     Diabetes Mother     High Blood Pressure Mother     Lung Cancer Mother     Schizophrenia Father     Bipolar Disorder Father     Bipolar Disorder Sister     Anxiety Disorder Brother     Stroke Maternal Grandmother     Emphysema Paternal Grandmother     Depression Sister     Depression Sister     Anxiety Disorder Sister     Clotting Disorder Sister         factor V heterozygous    Diabetes Sister     High Cholesterol Sister     Hypertension Sister     No Known Problems Brother     No Known Problems Son        Medications:     Current Outpatient Medications:     prednisoLONE 15 MG/5ML solution, Take 13.3 mLs by mouth daily for 5 days, Disp: 66.5 mL, Rfl: 0    albuterol sulfate HFA (VENTOLIN HFA) 108 (90 Base) MCG/ACT inhaler, Inhale 2 puffs into the lungs 4 times daily as needed for Wheezing, Disp: 18 g, Rfl: 0    azithromycin (ZITHROMAX) 200 MG/5ML suspension, Take 500 mg by mouth on day 1 then take 250 mg by mouth on days 2-5, Disp: 40 mL, Rfl: 0    lansoprazole (PREVACID) 30 MG delayed release capsule, Take 1 capsule by mouth daily, Disp: 90 capsule, Rfl: 1    sodium chloride (ALTAMIST SPRAY) 0.65 % nasal spray, 1 spray by Nasal route as needed for Congestion, Disp: 1 each, Rfl: 0    Allergies:      Allergies   Allergen Reactions    Amoxicillin-Pot Clavulanate      Abdominal pain    Nitrofurantoin Nausea Only       Social History:     Social History     Tobacco Use    Smoking status: Current Every Day Smoker     Packs/day: 1.00     Types: Cigarettes     Start date: 9/24/1998    Smokeless tobacco: Never Used   Substance Use Topics    Alcohol use: Yes     Comment: 2x monthly    Drug use: Never       Patient lives at home. Physical Exam:     Vitals:    02/09/22 0937 02/09/22 0939   BP: (!) 142/80 (!) 142/80   Pulse: 95    Resp: 18    Temp: 97.8 °F (36.6 °C)    TempSrc: Temporal    SpO2: 98%    Weight: 147 lb (66.7 kg)    Height: 5' 4\" (1.626 m)        Exam:  Physical Exam  Vitals and nursing note reviewed. Constitutional:       General: She is not in acute distress. Appearance: She is well-developed. HENT:      Head: Normocephalic and atraumatic. Right Ear: Tympanic membrane normal.      Left Ear: Tympanic membrane normal.      Nose: Nose normal.      Mouth/Throat:      Mouth: Mucous membranes are moist.      Pharynx: Oropharynx is clear. Eyes:      Conjunctiva/sclera: Conjunctivae normal.      Pupils: Pupils are equal, round, and reactive to light. Cardiovascular:      Rate and Rhythm: Normal rate and regular rhythm. Pulmonary:      Effort: Pulmonary effort is normal. No respiratory distress. Breath sounds: Wheezing present. Comments: Patient has wheezing with forced expiration bilaterally  Abdominal:      General: Bowel sounds are normal.      Palpations: Abdomen is soft. Tenderness: There is no abdominal tenderness. Musculoskeletal:         General: Normal range of motion. Cervical back: Normal range of motion. No rigidity. Lymphadenopathy:      Cervical: No cervical adenopathy. Skin:     General: Skin is warm and dry. Neurological:      General: No focal deficit present. Mental Status: She is alert and oriented to person, place, and time. Psychiatric:         Behavior: Behavior normal.         Thought Content: Thought content normal.         Judgment: Judgment normal.           Testing:           Medical Decision Making:   Patient upon arrival did not appear toxic or lethargic. Vital signs were reviewed. Past medical history reviewed. Allergies reviewed. Medications reviewed. Patient is presenting with the above complaint of cough and congestion. Patient took an at home COVID-19 test this morning and it was negative. Differential diagnosis was discussed with the patient. Patient will be given a prescription for azithromycin, prednisone and albuterol inhaler. Patient states that she only can swallow liquid medication. She declines IM injection of Depo-Medrol in the office. Patient may use over-the-counter analgesics and decongestants as needed. Patient is continue to monitor symptoms and follow-up with their primary care provider in 3-5 days if no improvement. Patient will return or go to the emergency department for any worsening symptoms. Patient understands the plan is agreeable. Clinical Impression:   Josh Welch was seen today for cough and congestion. Diagnoses and all orders for this visit:    Cough  -     azithromycin (ZITHROMAX) 200 MG/5ML suspension; Take 500 mg by mouth on day 1 then take 250 mg by mouth on days 2-5    Bronchitis    Other orders  -     prednisoLONE 15 MG/5ML solution; Take 13.3 mLs by mouth daily for 5 days  -     albuterol sulfate HFA (VENTOLIN HFA) 108 (90 Base) MCG/ACT inhaler; Inhale 2 puffs into the lungs 4 times daily as needed for Wheezing        The patient is to call for any concerns or return if any of the signs or symptoms worsen. The patient is to follow-up with PCP in the next 2-3 days for repeat evaluation repeat assessment or go directly to the emergency department. SIGNATURE: Isabelle Pacheco PA-C

## 2022-02-09 NOTE — LETTER
[facility]  [address] [place] [address]  Phone: [phone]  Fax: [address] [place], [place]        February 9, 2022     Patient: Leanna Sacks   YOB: 1980   Date of Visit: 2/9/2022       To Whom it May Concern:    Jaelyn Rivera was seen in my clinic on 2/9/2022. She may return to work on 2/9/2022. If you have any questions or concerns, please don't hesitate to call.     Sincerely,         SARAN MADISON

## 2022-02-14 ENCOUNTER — OFFICE VISIT (OUTPATIENT)
Dept: FAMILY MEDICINE CLINIC | Age: 42
End: 2022-02-14

## 2022-02-14 VITALS
OXYGEN SATURATION: 98 % | HEART RATE: 93 BPM | SYSTOLIC BLOOD PRESSURE: 122 MMHG | HEIGHT: 64 IN | WEIGHT: 147 LBS | RESPIRATION RATE: 18 BRPM | DIASTOLIC BLOOD PRESSURE: 70 MMHG | BODY MASS INDEX: 25.1 KG/M2 | TEMPERATURE: 98.2 F

## 2022-02-14 DIAGNOSIS — Z00.00 WELL WOMAN EXAM WITHOUT GYNECOLOGICAL EXAM: ICD-10-CM

## 2022-02-14 DIAGNOSIS — Z00.00 WELL WOMAN EXAM WITHOUT GYNECOLOGICAL EXAM: Primary | ICD-10-CM

## 2022-02-14 DIAGNOSIS — Z13.220 SCREENING FOR LIPID DISORDERS: ICD-10-CM

## 2022-02-14 DIAGNOSIS — E66.3 OVERWEIGHT (BMI 25.0-29.9): ICD-10-CM

## 2022-02-14 DIAGNOSIS — Z13.1 SCREENING FOR DIABETES MELLITUS: ICD-10-CM

## 2022-02-14 DIAGNOSIS — Z13.31 NEGATIVE DEPRESSION SCREENING: ICD-10-CM

## 2022-02-14 DIAGNOSIS — Z02.6 ENCOUNTER FOR EXAMINATION FOR INSURANCE PURPOSES: ICD-10-CM

## 2022-02-14 DIAGNOSIS — F17.200 TOBACCO USE DISORDER: ICD-10-CM

## 2022-02-14 DIAGNOSIS — N93.9 ABNORMAL UTERINE BLEEDING (AUB): ICD-10-CM

## 2022-02-14 LAB
ALBUMIN SERPL-MCNC: 4.4 G/DL (ref 3.5–5.2)
ALP BLD-CCNC: 73 U/L (ref 35–104)
ALT SERPL-CCNC: 13 U/L (ref 0–32)
ANION GAP SERPL CALCULATED.3IONS-SCNC: 12 MMOL/L (ref 7–16)
AST SERPL-CCNC: 13 U/L (ref 0–31)
BILIRUB SERPL-MCNC: <0.2 MG/DL (ref 0–1.2)
BUN BLDV-MCNC: 11 MG/DL (ref 6–20)
CALCIUM SERPL-MCNC: 9.4 MG/DL (ref 8.6–10.2)
CHLORIDE BLD-SCNC: 106 MMOL/L (ref 98–107)
CHOLESTEROL, TOTAL: 203 MG/DL (ref 0–199)
CO2: 21 MMOL/L (ref 22–29)
CREAT SERPL-MCNC: 0.7 MG/DL (ref 0.5–1)
GFR AFRICAN AMERICAN: >60
GFR NON-AFRICAN AMERICAN: >60 ML/MIN/1.73
GLUCOSE BLD-MCNC: 91 MG/DL (ref 74–99)
HDLC SERPL-MCNC: 39 MG/DL
LDL CHOLESTEROL CALCULATED: 133 MG/DL (ref 0–99)
POTASSIUM SERPL-SCNC: 3.7 MMOL/L (ref 3.5–5)
SODIUM BLD-SCNC: 139 MMOL/L (ref 132–146)
TOTAL PROTEIN: 7.6 G/DL (ref 6.4–8.3)
TRIGL SERPL-MCNC: 153 MG/DL (ref 0–149)
VLDLC SERPL CALC-MCNC: 31 MG/DL

## 2022-02-14 PROCEDURE — G0444 DEPRESSION SCREEN ANNUAL: HCPCS | Performed by: STUDENT IN AN ORGANIZED HEALTH CARE EDUCATION/TRAINING PROGRAM

## 2022-02-14 PROCEDURE — 99396 PREV VISIT EST AGE 40-64: CPT | Performed by: STUDENT IN AN ORGANIZED HEALTH CARE EDUCATION/TRAINING PROGRAM

## 2022-02-14 ASSESSMENT — ENCOUNTER SYMPTOMS
DIARRHEA: 0
SORE THROAT: 0
COUGH: 0
VOMITING: 0
SHORTNESS OF BREATH: 0
RHINORRHEA: 0
SINUS PRESSURE: 1
NAUSEA: 0

## 2022-02-14 NOTE — PROGRESS NOTES
[facility] VISIT    22  Name: Otilia Bar   : 1980   Age: 42 y.o. Sex: female        Assessment & Plan:   Candice Gonzáles was seen today for other. Diagnoses and all orders for this visit:    Well woman exam without gynecological exam  Comments:  Health maintenance updated  Orders:  -     LIPID PANEL; Future  -     COMPREHENSIVE METABOLIC PANEL; Future    Encounter for examination for insurance purposes  Comments:  Paperwork completed    Overweight (BMI 25.0-29. 9)  Comments:  Screening labs ordered  Orders:  -     LIPID PANEL; Future  -     COMPREHENSIVE METABOLIC PANEL; Future    Screening for lipid disorders  -     LIPID PANEL; Future    Screening for diabetes mellitus  -     COMPREHENSIVE METABOLIC PANEL; Future    Tobacco use disorder  Comments:  Not yet ready to quit  Counseled on risks of continued tobacco use  Counseled on cessation    Abnormal uterine bleeding (AUB)  Comments:  Irregular bleeding  Declines Pap today  Desires referral to OB/GYN  Orders:  -     ([facility]) - Yordy Sanz MD, OB/GYN, [place] ([facility])    Negative depression screening  -     [id number] patient regarding above diagnosis, including possible risks and complications, especially if left uncontrolled. Counseled patient as appropriate and relevant regarding any possible side effects, risks, and alternatives to treatment; the patient verbalizes understanding, and is in agreement with the plan as detailed above. All educational materials and instructions were discussed and included on the After Visit Summary. All questions answered to the patient's satisfaction. The patient was advised to call for any concerns prior to next appointment. Return in about 3 months (around 2022) for tobacco use. This provider and patient were wearing surgical masks and practiced social distancing when appropriate during visit due to COVID-19 pandemic.     Subjective:     Chief Complaint   Patient presents with    Other     work physical       Patient presents today for wellness visit. Health Maintenance Due   Topic Date Due    Hepatitis C screen  Never done    COVID-19 Vaccine (1) Never done    Pneumococcal 0-64 years Vaccine (1 of 2 - PPSV23) Never done    HIV screen  Never done    DTaP/Tdap/Td vaccine (1 - Tdap) Never done    Cervical cancer screen  Never done    Diabetes screen  Never done    Lipid screen  Never done    Flu vaccine (1) Never done         Diet: junk food, balanced otherwise   Exercise: in warm weather   Domestic violence: denies   Depression screening: negative   Menses: irregular, bleeding 3x monthly   STI history: chlamydia in past, declines screening, asymptomatic   Birth control: none   Pregnancy history:    Cervical cancer screening: declines, normal in past   Breast cancer screening: declines   BRCA screening: no personal or family history of breast, ovarian, tubal, or peritoneal cancer or family history of BRCA 1/2    Diabetes screening: due   Lipids screening: due    Review of Systems   Constitutional: Negative for chills and fever. HENT: Positive for congestion, postnasal drip and sinus pressure. Negative for rhinorrhea and sore throat. Eyes: Negative for visual disturbance. Respiratory: Negative for cough and shortness of breath. Cardiovascular: Negative for chest pain. Gastrointestinal: Negative for diarrhea, nausea and vomiting. Genitourinary: Positive for menstrual problem (Irregular). Negative for difficulty urinating, dysuria, vaginal discharge and vaginal pain. Musculoskeletal: Negative for arthralgias and myalgias. Skin: Negative for rash. Allergic/Immunologic: Positive for environmental allergies. Neurological: Positive for headaches. Negative for light-headedness. Psychiatric/Behavioral: Negative for dysphoric mood. The patient is not nervous/anxious.         Medical History:   History reviewed in the Last Year: Not on file       Physical Exam:     Vitals:    02/14/22 1523   BP: 122/70   Pulse: 93   Resp: 18   Temp: 98.2 °F (36.8 °C)   TempSrc: Temporal   SpO2: 98%   Weight: 147 lb (66.7 kg)   Height: 5' 4\" (1.626 m)       BP Readings from Last 3 Encounters:   02/14/22 122/70   02/09/22 (!) 142/80   02/04/22 118/70       Wt Readings from Last 3 Encounters:   02/14/22 147 lb (66.7 kg)   02/09/22 147 lb (66.7 kg)   02/04/22 150 lb (68 kg)       Physical Exam  Vitals and nursing note reviewed. Constitutional:       General: She is not in acute distress. Appearance: Normal appearance. She is overweight. She is not ill-appearing or diaphoretic. Cardiovascular:      Rate and Rhythm: Normal rate and regular rhythm. Heart sounds: Normal heart sounds. Pulmonary:      Effort: Pulmonary effort is normal. No respiratory distress. Breath sounds: Normal breath sounds. Musculoskeletal:      Right lower leg: No edema. Left lower leg: No edema. Skin:     General: Skin is warm and dry. Neurological:      Mental Status: She is alert and oriented to person, place, and time.    Psychiatric:         Mood and Affect: Mood normal.         Behavior: Behavior normal.         Testing:     Orders Placed This Encounter   Procedures    LIPID PANEL     Standing Status:   Future     Number of Occurrences:   1     Standing Expiration Date:   2/14/2023     Order Specific Question:   Is Patient Fasting?/# of Hours     Answer:   No    COMPREHENSIVE METABOLIC PANEL     Standing Status:   Future     Number of Occurrences:   1     Standing Expiration Date:   2/14/2023    ([facility]) - Charlie De Jesus MD, OB/GYN, [place] ([facility])     Referral Priority:   Routine     Referral Type:   Eval and Treat     Referral Reason:   Specialty Services Required     Referred to Provider:   Dipika Cage MD     Requested Specialty:   Obstetrics & Gynecology     Number of Visits Requested:   [facility]  No results found for this or any previous visit (from the past 24 hour(s)).

## 2022-02-16 DIAGNOSIS — E78.2 MIXED HYPERLIPIDEMIA: Primary | ICD-10-CM

## 2022-02-22 DIAGNOSIS — J01.90 ACUTE BACTERIAL SINUSITIS: Primary | ICD-10-CM

## 2022-02-22 DIAGNOSIS — B96.89 ACUTE BACTERIAL SINUSITIS: Primary | ICD-10-CM

## 2022-02-22 RX ORDER — AMOXICILLIN 250 MG/5ML
875 POWDER, FOR SUSPENSION ORAL 2 TIMES DAILY
Qty: 245 ML | Refills: 0 | Status: SHIPPED | OUTPATIENT
Start: 2022-02-22 | End: 2022-03-01

## 2022-03-11 DIAGNOSIS — E78.2 MIXED HYPERLIPIDEMIA: ICD-10-CM

## 2022-03-11 LAB
CHOLESTEROL, FASTING: 219 MG/DL (ref 0–199)
HDLC SERPL-MCNC: 41 MG/DL
LDL CHOLESTEROL CALCULATED: 155 MG/DL (ref 0–99)
TRIGLYCERIDE, FASTING: 113 MG/DL (ref 0–149)
VLDLC SERPL CALC-MCNC: 23 MG/DL

## 2022-03-23 ENCOUNTER — TELEPHONE (OUTPATIENT)
Dept: PRIMARY CARE CLINIC | Age: 42
End: 2022-03-23

## 2022-03-23 NOTE — TELEPHONE ENCOUNTER
----- Message from Liyah Indira sent at 3/23/2022  3:34 PM EDT -----  Subject: Message to Provider    QUESTIONS  Information for Provider? Pt tried calling back to return phone call for   blood work.   ---------------------------------------------------------------------------  --------------  [address]  What is the best way for the office to contact you? OK to leave message on   voicemail  Preferred Call Back Phone Number?  [phone]  ---------------------------------------------------------------------------  --------------  SCRIPT ANSWERS  undefined

## 2022-05-16 ENCOUNTER — OFFICE VISIT (OUTPATIENT)
Dept: PRIMARY CARE CLINIC | Age: 42
End: 2022-05-16
Payer: COMMERCIAL

## 2022-05-16 VITALS
OXYGEN SATURATION: 97 % | RESPIRATION RATE: 18 BRPM | HEIGHT: 64 IN | TEMPERATURE: 97.8 F | SYSTOLIC BLOOD PRESSURE: 118 MMHG | DIASTOLIC BLOOD PRESSURE: 78 MMHG | WEIGHT: 141 LBS | HEART RATE: 99 BPM | BODY MASS INDEX: 24.07 KG/M2

## 2022-05-16 DIAGNOSIS — K21.9 GASTROESOPHAGEAL REFLUX DISEASE, UNSPECIFIED WHETHER ESOPHAGITIS PRESENT: ICD-10-CM

## 2022-05-16 DIAGNOSIS — T36.95XA ANTIBIOTIC-INDUCED YEAST INFECTION: ICD-10-CM

## 2022-05-16 DIAGNOSIS — H69.81 ACUTE DYSFUNCTION OF RIGHT EUSTACHIAN TUBE: ICD-10-CM

## 2022-05-16 DIAGNOSIS — B37.9 ANTIBIOTIC-INDUCED YEAST INFECTION: ICD-10-CM

## 2022-05-16 DIAGNOSIS — N30.90 CYSTITIS: ICD-10-CM

## 2022-05-16 DIAGNOSIS — K06.9 DISEASE OF GINGIVA DUE TO RECURRENT ORAL HERPES SIMPLEX VIRUS (HSV) INFECTION: ICD-10-CM

## 2022-05-16 DIAGNOSIS — E78.2 MIXED HYPERLIPIDEMIA: ICD-10-CM

## 2022-05-16 DIAGNOSIS — F17.200 TOBACCO USE DISORDER: Primary | ICD-10-CM

## 2022-05-16 DIAGNOSIS — B00.9 DISEASE OF GINGIVA DUE TO RECURRENT ORAL HERPES SIMPLEX VIRUS (HSV) INFECTION: ICD-10-CM

## 2022-05-16 PROBLEM — E66.3 OVERWEIGHT (BMI 25.0-29.9): Status: ACTIVE | Noted: 2022-05-16

## 2022-05-16 LAB
BILIRUBIN, POC: NEGATIVE
BLOOD URINE, POC: NORMAL
CLARITY, POC: NORMAL
COLOR, POC: NORMAL
GLUCOSE URINE, POC: NEGATIVE
KETONES, POC: NEGATIVE
LEUKOCYTE EST, POC: NORMAL
NITRITE, POC: NEGATIVE
PH, POC: 5
PROTEIN, POC: 100
SPECIFIC GRAVITY, POC: 1.03
UROBILINOGEN, POC: 0.2

## 2022-05-16 PROCEDURE — G8427 DOCREV CUR MEDS BY ELIG CLIN: HCPCS | Performed by: STUDENT IN AN ORGANIZED HEALTH CARE EDUCATION/TRAINING PROGRAM

## 2022-05-16 PROCEDURE — 81002 URINALYSIS NONAUTO W/O SCOPE: CPT | Performed by: STUDENT IN AN ORGANIZED HEALTH CARE EDUCATION/TRAINING PROGRAM

## 2022-05-16 PROCEDURE — G8420 CALC BMI NORM PARAMETERS: HCPCS | Performed by: STUDENT IN AN ORGANIZED HEALTH CARE EDUCATION/TRAINING PROGRAM

## 2022-05-16 PROCEDURE — 99214 OFFICE O/P EST MOD 30 MIN: CPT | Performed by: STUDENT IN AN ORGANIZED HEALTH CARE EDUCATION/TRAINING PROGRAM

## 2022-05-16 PROCEDURE — 4004F PT TOBACCO SCREEN RCVD TLK: CPT | Performed by: STUDENT IN AN ORGANIZED HEALTH CARE EDUCATION/TRAINING PROGRAM

## 2022-05-16 RX ORDER — LANSOPRAZOLE 30 MG/1
30 CAPSULE, DELAYED RELEASE ORAL DAILY
Qty: 90 CAPSULE | Refills: 1 | Status: SHIPPED
Start: 2022-05-16 | End: 2022-07-20

## 2022-05-16 RX ORDER — FLUCONAZOLE 150 MG/1
150 TABLET ORAL
Qty: 2 TABLET | Refills: 0 | Status: SHIPPED | OUTPATIENT
Start: 2022-05-16 | End: 2022-05-22

## 2022-05-16 RX ORDER — SULFAMETHOXAZOLE AND TRIMETHOPRIM 800; 160 MG/1; MG/1
1 TABLET ORAL 2 TIMES DAILY
Qty: 6 TABLET | Refills: 0 | Status: SHIPPED | OUTPATIENT
Start: 2022-05-16 | End: 2022-05-19

## 2022-05-16 RX ORDER — VALACYCLOVIR HYDROCHLORIDE 1 G/1
2000 TABLET, FILM COATED ORAL 2 TIMES DAILY
Qty: 4 TABLET | Refills: 2 | Status: SHIPPED | OUTPATIENT
Start: 2022-05-16 | End: 2022-05-17

## 2022-05-16 RX ORDER — PHENAZOPYRIDINE HYDROCHLORIDE 100 MG/1
100 TABLET, FILM COATED ORAL 3 TIMES DAILY PRN
Qty: 6 TABLET | Refills: 0 | Status: SHIPPED
Start: 2022-05-16 | End: 2022-09-21

## 2022-05-16 ASSESSMENT — ENCOUNTER SYMPTOMS
SORE THROAT: 0
SHORTNESS OF BREATH: 0

## 2022-05-16 NOTE — PATIENT INSTRUCTIONS
Patient Education        Learning About the Mediterranean Diet  What is the [address]? The Mediterranean diet is a style of eating rather than a diet plan. It features foods eaten in [place], [place], [place] and [place], and other countries along the [facility]. It emphasizes eating foods like fish, fruits, vegetables, beans, high-fiber breads and whole grains, nuts, and oliveoil. This style of eating includes limited red meat, cheese, and sweets. Why choose the Mediterranean diet? A Mediterranean-style diet may improve heart health. It contains more fat than other heart-healthy diets. But the fats are mainly from nuts, unsaturated oils (such as fish oils and olive oil), and certain nut or seed oils (such as canola, soybean, or flaxseed oil). These fats may help protect the heart andblood vessels. How can you get started on the Mediterranean diet? Here are some things you can do to switch to a more Mediterranean way of eating. What to eat   Eat a variety of fruits and vegetables each day, such as grapes, blueberries, tomatoes, broccoli, peppers, figs, olives, spinach, eggplant, beans, lentils, and chickpeas.  Eat a variety of whole-grain foods each day, such as oats, brown rice, and whole wheat bread, pasta, and couscous.  Eat fish at least 2 times a week. Try tuna, salmon, mackerel, lake trout, herring, or sardines.  Eat moderate amounts of low-fat dairy products, such as milk, cheese, or yogurt.  Eat moderate amounts of poultry and eggs.  Choose healthy (unsaturated) fats, such as nuts, olive oil, and certain nut or seed oils like canola, soybean, and flaxseed.  Limit unhealthy (saturated) fats, such as butter, palm oil, and coconut oil. And limit fats found in animal products, such as meat and dairy products made with whole milk. Try to eat red meat only a few times a month in very small amounts.  Limit sweets and desserts to only a few times a week.  This includes sugar-sweetened drinks like soda. The Mediterranean diet may also include red wine with your meal--1 glass eachday for women and up to 2 glasses a day for men. Tips for eating at home   Use herbs, spices, garlic, lemon zest, and citrus juice instead of salt to add flavor to foods.  Add avocado slices to your sandwich instead of cook.  Have fish for lunch or dinner instead of red meat. Brush the fish with olive oil, and broil or grill it.  Sprinkle your salad with seeds or nuts instead of cheese. [place] with olive or canola oil instead of butter or oils that are high in saturated fat.  Switch from 2% milk or whole milk to 1% or fat-free milk.  Dip raw vegetables in a vinaigrette dressing or hummus instead of dips made from mayonnaise or sour cream.   Have a piece of fruit for dessert instead of a piece of cake. Try baked apples, or have some dried fruit. Tips for eating out   Try broiled, grilled, baked, or poached fish instead of having it fried or breaded.  Ask your  to have your meals prepared with olive oil instead of butter.  Order dishes made with marinara sauce or sauces made from olive oil. Avoid sauces made from cream or mayonnaise.  Choose whole-grain breads, whole wheat pasta and pizza crust, brown rice, beans, and lentils.  Cut back on butter or margarine on bread. Instead, you can dip your bread in a small amount of olive oil.  Ask for a side salad or grilled vegetables instead of french fries or chips. Where can you learn more? Go to https://[employer] Technologymckennaeweb.Cellity. org and sign in to your [employer] account. Enter [phone] in the [facility] box to learn more about \"Learning About the Mediterranean Diet. \"     If you do not have an account, please click on the \"Sign Up Now\" link. Current as of: September 8, 2021               Content Version: 13.2  © 5904-3105 Healthwise, Incorporated. Care instructions adapted under license by [facility] ([facility]).  If you have questions about a medical condition or this instruction, always ask your healthcare professional. [address] any warranty or liability for your use of this information. Patient Education        High Cholesterol: Care Instructions  Overview     Cholesterol is a type of fat in your blood. It is needed for many body functions, such as making new cells. Cholesterol is made by your body. It also comes from food you eat. High cholesterol means that you have too much of thefat in your blood. This raises your risk of a heart attack and stroke. LDL and HDL are part of your total cholesterol. LDL is the \"bad\" cholesterol. High LDL can raise your risk for coronary artery disease, heart attack, and stroke. HDL is the \"good\" cholesterol. It helps clear bad cholesterol from the body. High HDL is linked with a lower risk of coronary artery disease, heartattack, and stroke. Your cholesterol levels help your doctor find out your risk for having a heart attack or stroke. You and your doctor can talk about whether you need to loweryour risk and what treatment is best for you. Treatment options include a heart-healthy lifestyle and medicine. Both options can help lower your cholesterol and your risk. The way you choose to lower your risk will depend on how high your risk is for heart attack and stroke. It willalso depend on how you feel about taking medicines. Follow-up care is a key part of your treatment and safety. Be sure to make and go to all appointments, and call your doctor if you are having problems. It's also a good idea to know your test results and keep alist of the medicines you take. How can you care for yourself at home?  Eat heart-healthy foods. ? Eat fruits, vegetables, whole grains, beans, and other high-fiber foods. ? Eat lean proteins, such as seafood, lean meats, beans, nuts, and soy products. ? Eat healthy fats, such as canola and olive oil. ?  Choose foods that are low in saturated fat. ? Limit sodium and alcohol. ? Limit drinks and foods with added sugar.  Be physically active. Try to do moderate activity at least 2½ hours a week. Or try to do vigorous activity at least 1¼ hours a week. You may want to walk or try other activities, such as running, swimming, cycling, or playing tennis or team sports.  Stay at a healthy weight or lose weight by making the changes in eating and physical activity listed above. Losing just a small amount of weight, even 5 to 10 pounds, can help reduce your risk for having a heart attack or stroke.  Do not smoke.  Manage other health problems. These include diabetes and high blood pressure. If you think you may have a problem with alcohol or drug use, talk to your doctor.  If you take medicine, take it exactly as prescribed. Call your doctor if you think you are having a problem with your medicine.  Check with your doctor or pharmacist before you use any other medicines, including over-the-counter medicines. Make sure your doctor knows all of the medicines, vitamins, herbal products, and supplements you take. Taking some medicines together can cause problems. When should you call for help? Watch closely for changes in your health, and be sure to contact your doctor if:     You need help making lifestyle changes.      You have questions about your medicine. Where can you learn more? Go to https://[employer]oupe.ImaCor. org and sign in to your [employer] account. Enter Q678 in the [facility] box to learn more about \"High Cholesterol: Care Instructions. \"     If you do not have an account, please click on the \"Sign Up Now\" link. Current as of: January 10, 2022               Content Version: 13.2  © 4187-8928 Healthwise, Incorporated. Care instructions adapted under license by [facility] ([facility]).  If you have questions about a medical condition or this instruction, always ask your healthcare professional. [employer] Incorporated disclaims any warranty or liability for your use of this information.

## 2022-05-16 NOTE — PROGRESS NOTES
ESTABLISHED PRIMARY CARE VISIT    21  Name: Reynaldo Shah   : 1980   Age: 42 y.o. 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     Declines prophylaxis  Provided Valtrex to use as needed         Gastroesophageal reflux disease     Continue PPI            Other    Tobacco use disorder - Primary     1ppd  Pre-contemplative stages  Encouraged cessation         Mixed hyperlipidemia     10-year ASCVD risk score 4.3%  Discussed lifestyle changes          Other Visit Diagnoses       Acute dysfunction of right eustachian tube        Exam normal  Suspect eustachian tube dysfunction  Declines treatment for this    Cystitis        UA with evidence of UTI  Treat empirically with Bactrim, send culture  Pyridium as needed  Discussed return precautions for signs of pyelo      Relevant Orders    POCT Urinalysis no Micro (Completed)    Antibiotic-induced yeast infection        Provided Diflucan to use as needed if yeast infection after Bactrim            Counseled patient regarding above diagnosis, including possible risks and complications, especially if left uncontrolled. Counseled patient as appropriate and relevant regarding any possible side effects, risks, and alternatives to treatment; the patient verbalizes understanding, and is in agreement with the plan as detailed above. All educational materials and instructions were discussed and included on the After Visit Summary. All questions answered to the patient's satisfaction. The patient was advised to call or send [employer] message for any concerns prior to next appointment. Return in about 6 months (around 2022) for follow-up tobacco, reflux. This provider and patient were wearing surgical masks and practiced social distancing when appropriate during visit due to COVID-19 pandemic.     Subjective: Chief Complaint   Patient presents with    Nicotine Dependence       Patient returns for follow-up of smoking, hyperlipidemia    Reports bladder pressure and hematuria that started today  Unsure if UTI or from intercourse  Had vaginal discharge after intercourse, otherwise no vaginal symptoms  Patient was also supposed to have Pap with GYN  Scheduled next week  Declined STI test, planning to have done with GYN    Reports right ear pain for 1 week  Feels like her lymph node is swollen below right ear  Worried she has ear infection  Niece had similar issue and was dx with viral infection    In the process of separation  Reports increased stress  Had 4 cold sores last week due to this      Review of Systems   Constitutional:  Negative for chills and fever. HENT:  Positive for ear pain. Negative for congestion, postnasal drip and sore throat. Respiratory:  Negative for shortness of breath. Cardiovascular:  Negative for chest pain. Genitourinary:  Positive for difficulty urinating, hematuria, urgency and vaginal discharge. Negative for dysuria, flank pain, frequency, vaginal bleeding and vaginal pain. Medical History:     Patient Active Problem List   Diagnosis    Gastroesophageal reflux disease    Low back pain    Tobacco use disorder    Mixed hyperlipidemia    Overweight (BMI 25.0-29. 9)        Past Medical History:   Diagnosis Date    Alcohol dependence ([address]     in remission    Dysphagia     Hypoglycemia 2005       Past Surgical History:   Procedure Laterality Date    CAPSULE ENDOSCOPY      MYRINGOTOMY AND TYMPANOSTOMY TUBE PLACEMENT Bilateral     TONSILLECTOMY AND ADENOIDECTOMY      UPPER GASTROINTESTINAL ENDOSCOPY         Family History   Problem Relation Age of Onset    Coronary Art Dis Mother     Diabetes Mother     High Blood Pressure Mother     Lung Cancer Mother     Schizophrenia Father     Bipolar Disorder Father     Bipolar Disorder Sister     Anxiety Disorder Brother     Stroke Maternal Grandmother     Emphysema Paternal Grandmother     Depression Sister     Depression Sister     Anxiety Disorder Sister     Clotting Disorder Sister         factor V heterozygous    Diabetes Sister     High Cholesterol Sister     Hypertension Sister     No Known Problems Brother     No Known Problems Son        Medications:     Current Outpatient Medications:     lansoprazole (PREVACID) 30 MG delayed release capsule, Take 1 capsule by mouth daily, Disp: 90 capsule, Rfl: 1    sodium chloride (ALTAMIST SPRAY) 0.65 % nasal spray, 1 spray by Nasal route as needed for Congestion, Disp: 1 each, Rfl: 0    Allergies: Allergies   Allergen Reactions    Amoxicillin-Pot Clavulanate      Abdominal pain    Nitrofurantoin Nausea Only       Social History:     Social History     Socioeconomic History    Marital status:      Spouse name: Not on file    Number of children: Not on file    Years of education: Not on file    Highest education level: Not on file   Occupational History    Not on file   Tobacco Use    Smoking status: Current Every Day Smoker     Packs/day: 1.00     Types: Cigarettes     Start date: 9/24/1998    Smokeless tobacco: Never Used   Substance and Sexual Activity    Alcohol use: Yes     Comment: 2x monthly    Drug use: Never    Sexual activity: Yes     Partners: Male     Comment:    Other Topics Concern    Not on file   Social History Narrative    Not on file     Social Determinants of Health     Financial Resource Strain:     Difficulty of Paying Living Expenses: Not on file   Food Insecurity:     Worried About [address] in the Last Year: Not on file    Deven of Food in the Last Year: Not on file   Transportation Needs:     Lack of Transportation (Medical): Not on file    Lack of Transportation (Non-Medical):  Not on file   Physical Activity:     Days of Exercise per Week: Not on file    Minutes of Exercise per Session: Not on file   Stress:     Feeling of Stress : Not on file   Social Connections:     Frequency of Communication with Friends and Family: Not on file    Frequency of Social Gatherings with Friends and Family: Not on file    Attends [employer] Services: Not on file    Active Member of Clubs or Organizations: Not on file    Attends Club or Organization Meetings: Not on file    Marital Status: Not on file   Intimate Partner Violence:     Fear of Current or Ex-Partner: Not on file    Emotionally Abused: Not on file    Physically Abused: Not on file    Sexually Abused: Not on file   Housing Stability:     Unable to Pay for Housing in the Last Year: Not on file    Number of [place] in the Last Year: Not on file    Unstable Housing in the Last Year: Not on file       Physical Exam:     Vitals:    05/16/22 1109   BP: 118/78   Pulse: 99   Resp: 18   Temp: 97.8 °F (36.6 °C)   TempSrc: Temporal   SpO2: 97%   Weight: 141 lb (64 kg)   Height: 5' 4\" (1.626 m)       BP Readings from Last 3 Encounters:   05/16/22 118/78   02/14/22 122/70   02/09/22 (!) 142/80       Wt Readings from Last 3 Encounters:   05/16/22 141 lb (64 kg)   02/14/22 147 lb (66.7 kg)   02/09/22 147 lb (66.7 kg)       Physical Exam  Vitals and nursing note reviewed. Constitutional:       General: She is not in acute distress. Appearance: Normal appearance. She is normal weight. She is not ill-appearing or diaphoretic. HENT:      Right Ear: Tympanic membrane, ear canal and external ear normal.      Left Ear: Tympanic membrane, ear canal and external ear normal.      Nose: Nose normal. No congestion or rhinorrhea. Mouth/Throat:      Mouth: Mucous membranes are moist.      Pharynx: Oropharynx is clear. No oropharyngeal exudate or posterior oropharyngeal erythema. Eyes:      Extraocular Movements: Extraocular movements intact. Conjunctiva/sclera: Conjunctivae normal.   Cardiovascular:      Rate and Rhythm: Normal rate and regular rhythm. Heart sounds: Normal heart sounds.    Pulmonary:      Effort: Pulmonary effort is normal. No respiratory distress. Breath sounds: Normal breath sounds. No wheezing, rhonchi or rales. Abdominal:      General: Bowel sounds are normal.      Palpations: Abdomen is soft. Tenderness: There is no abdominal tenderness. There is no right CVA tenderness or left CVA tenderness. Musculoskeletal:      Cervical back: Normal range of motion and neck supple. Skin:     General: Skin is warm and dry. Neurological:      Mental Status: She is alert and oriented to person, place, and time.        Testing:     Orders Placed This Encounter   Procedures    POCT Urinalysis no Micro        Recent Results (from the past 24 hour(s))   POCT Urinalysis no Micro    Collection Time: 05/16/22 11:33 AM   Result Value Ref Range    Color, UA dark     Clarity, UA cloudy     Glucose, UA POC negative     Bilirubin, UA negative     Ketones, UA negative     Spec Grav, UA 1.030     Blood, UA POC large     pH, UA 5.0     Protein, UA      Urobilinogen, UA 0.2     Leukocytes, UA small     Nitrite, UA negative

## 2022-05-19 LAB
ORGANISM: ABNORMAL
URINE CULTURE, ROUTINE: ABNORMAL
URINE CULTURE, ROUTINE: ABNORMAL

## 2022-07-20 DIAGNOSIS — K21.9 GASTROESOPHAGEAL REFLUX DISEASE, UNSPECIFIED WHETHER ESOPHAGITIS PRESENT: ICD-10-CM

## 2022-07-20 RX ORDER — LANSOPRAZOLE 30 MG/1
30 CAPSULE, DELAYED RELEASE ORAL DAILY
Qty: 90 CAPSULE | Refills: 1 | Status: SHIPPED | OUTPATIENT
Start: 2022-07-20

## 2022-07-20 NOTE — TELEPHONE ENCOUNTER
Last Appointment:  5/16/2022  Future Appointments   Date Time Provider Lalo Flores   11/16/2022 11:00 AM Salena Chavez  [facility]

## 2022-08-26 ENCOUNTER — OFFICE VISIT (OUTPATIENT)
Dept: FAMILY MEDICINE CLINIC | Age: 42
End: 2022-08-26
Payer: COMMERCIAL

## 2022-08-26 VITALS
TEMPERATURE: 98.7 F | BODY MASS INDEX: 23.69 KG/M2 | OXYGEN SATURATION: 98 % | WEIGHT: 138 LBS | DIASTOLIC BLOOD PRESSURE: 70 MMHG | HEART RATE: 98 BPM | SYSTOLIC BLOOD PRESSURE: 120 MMHG

## 2022-08-26 DIAGNOSIS — S20.219A CONTUSION OF CHEST WALL, UNSPECIFIED LATERALITY, INITIAL ENCOUNTER: Primary | ICD-10-CM

## 2022-08-26 DIAGNOSIS — J34.89 SINUS DRAINAGE: ICD-10-CM

## 2022-08-26 PROCEDURE — 4004F PT TOBACCO SCREEN RCVD TLK: CPT | Performed by: INTERNAL MEDICINE

## 2022-08-26 PROCEDURE — 99213 OFFICE O/P EST LOW 20 MIN: CPT | Performed by: INTERNAL MEDICINE

## 2022-08-26 PROCEDURE — G8420 CALC BMI NORM PARAMETERS: HCPCS | Performed by: INTERNAL MEDICINE

## 2022-08-26 PROCEDURE — G8427 DOCREV CUR MEDS BY ELIG CLIN: HCPCS | Performed by: INTERNAL MEDICINE

## 2022-08-26 RX ORDER — AMOXICILLIN 250 MG/5ML
POWDER, FOR SUSPENSION ORAL
Qty: 100 ML | Refills: 0 | Status: SHIPPED
Start: 2022-08-26 | End: 2022-09-01

## 2022-08-27 ASSESSMENT — ENCOUNTER SYMPTOMS
SINUS PAIN: 0
SHORTNESS OF BREATH: 0
CHEST TIGHTNESS: 0
EYES NEGATIVE: 1
COLOR CHANGE: 0
COUGH: 1
SINUS PRESSURE: 0
SORE THROAT: 0
WHEEZING: 0

## 2022-08-27 NOTE — PROGRESS NOTES
[address] [place]     22  Rosalie Otero : 1980 Sex: female  Age: 42 y.o. Chief Complaint   Patient presents with    Chest Pain     Person fell on chest causing pain 2 days ago    Sinus Problem       HPI  Patient presents to express care today with a couple issues. Firstly she states that she and her  would [place] nights ago and more wrestling on the floor when he fell on her landing on her with his elbow on her chest.  Complaining of sternal and right rib area pain related to that incident. States that it hurts to take a deep breath. Second issue is related to sinus symptoms with drainage, cough colored mucus production over the last several days. Denies fever or chills. Denies headache or myalgia. Denies sore throat, shortness of breath, loss of taste or smell. Denies recent exposures. Patient is unvaccinated to [facility]. Review of Systems   Constitutional:  Negative for chills and fever. HENT:  Positive for congestion and postnasal drip. Negative for ear pain, sinus pressure, sinus pain and sore throat. Eyes: Negative. Respiratory:  Positive for cough. Negative for chest tightness, shortness of breath and wheezing. Cardiovascular:  Positive for chest pain. Chest pain is musculoskeletal-see above   Musculoskeletal:  Negative for myalgias. See above regarding her injury. Skin:  Negative for color change. Neurological:  Negative for headaches. REST OF PERTINENT ROS GONE OVER AND WAS NEGATIVE.                  Current Outpatient Medications:     amoxicillin (AMOXIL) 250 MG/5ML suspension, Take 2 teaspoonful po tid, Disp: 100 mL, Rfl: 0    lansoprazole (PREVACID) 30 MG delayed release capsule, TAKE 1 CAPSULE BY MOUTH DAILY, Disp: 90 capsule, Rfl: 1    phenazopyridine (PYRIDIUM) 100 MG tablet, Take 1 tablet by mouth 3 times daily as needed (bladder spasm) (Patient not taking: Reported on 2022), Disp: 6 tablet, Rfl: 0    sodium chloride (ALTAMIST SPRAY) 0.65 % nasal spray, 1 spray by Nasal route as needed for Congestion (Patient not taking: Reported on 6/8/2022), Disp: 1 each, Rfl: 0  Allergies   Allergen Reactions    Amoxicillin-Pot Clavulanate      Abdominal pain    Nitrofurantoin Nausea Only       Past Medical History:   Diagnosis Date    Alcohol dependence ([facility] [address]     in remission    Dysphagia     Hypoglycemia 2005     Past Surgical History:   Procedure Laterality Date    CAPSULE ENDOSCOPY      MYRINGOTOMY AND TYMPANOSTOMY TUBE PLACEMENT Bilateral     TONSILLECTOMY AND ADENOIDECTOMY      UPPER GASTROINTESTINAL ENDOSCOPY       Family History   Problem Relation Age of Onset    Coronary Art Dis Mother     Diabetes Mother     High Blood Pressure Mother     Lung Cancer Mother     Schizophrenia Father     Bipolar Disorder Father     Bipolar Disorder Sister     Anxiety Disorder Brother     Stroke Maternal Grandmother     Emphysema Paternal Grandmother     Depression Sister     Depression Sister     Anxiety Disorder Sister     Clotting Disorder Sister         factor V heterozygous    Diabetes Sister     High Cholesterol Sister     Hypertension Sister     No Known Problems Brother     No Known Problems Son      Social History     Socioeconomic History    Marital status:      Spouse name: Not on file    Number of children: Not on file    Years of education: Not on file    Highest education level: Not on file   Occupational History    Not on file   Tobacco Use    Smoking status: Every Day     Packs/day: 1.00     Types: Cigarettes     Start date: 9/24/1998    Smokeless tobacco: Never   Substance and Sexual Activity    Alcohol use: Yes     Comment: 2x monthly    Drug use: Never    Sexual activity: Yes     Partners: Male     Comment:    Other Topics Concern    Not on file   Social History Narrative    Not on file     Social Determinants of Health     Financial Resource Strain: Not on file   Food Insecurity: Not on file   Transportation Needs: Not on file   Physical Activity: Not on file   Stress: Not on file   Social Connections: Not on file   Intimate Partner Violence: Not on file   Housing Stability: Not on file       Vitals:    08/26/22 1235   BP: 120/70   Pulse: 98   Temp: 98.7 °F (37.1 °C)   SpO2: 98%   Weight: 138 lb (62.6 kg)       Physical Exam  Vitals and nursing note reviewed. Constitutional:       General: She is not in acute distress. Appearance: She is well-developed. HENT:      Head: Normocephalic and atraumatic. Right Ear: Tympanic membrane, ear canal and external ear normal.      Left Ear: Tympanic membrane, ear canal and external ear normal.      Nose: Nose normal.      Mouth/Throat:      Mouth: Mucous membranes are moist.      Pharynx: Oropharynx is clear. Posterior oropharyngeal erythema present. No oropharyngeal exudate. Comments: Thick whitish mucus draining posterior pharynx  Cardiovascular:      Rate and Rhythm: Normal rate and regular rhythm. Heart sounds: Normal heart sounds. No murmur heard. Pulmonary:      Effort: Pulmonary effort is normal. No respiratory distress. Breath sounds: Normal breath sounds. No wheezing, rhonchi or rales. Musculoskeletal:         General: Tenderness present. No deformity. Cervical back: Normal range of motion and neck supple. No tenderness. Comments: Patient does have reproducible sternal tenderness and left rib tenderness to palpation. Bruising or redness noted. Lymphadenopathy:      Cervical: No cervical adenopathy. Skin:     General: Skin is warm and dry. Findings: No bruising or erythema. Neurological:      Mental Status: She is alert and oriented to person, place, and time. Psychiatric:         Mood and Affect: Mood normal.         Behavior: Behavior normal.         Thought Content:  Thought content normal.         Judgment: Judgment normal.                 Assessment and Plan:  Cha Alcala was seen today for chest pain and sinus problem. Diagnoses and all orders for this visit:    Contusion of chest wall, unspecified laterality, initial encounter  -     XR RIBS RIGHT INCLUDE CHEST (MIN 3 VIEWS); Future  -     XR STERNUM (MIN 2 VIEWS); Future    Sinus drainage    Other orders  -     amoxicillin (AMOXIL) 250 MG/5ML suspension; Take 2 teaspoonful po tid    : I did get started on and chest with right ribs. I offered Toradol and naproxen which she declined. She states for sinus symptoms she usually gets liquid amoxicillin which works for her. I did give her that. Warned of potential side effects. Probiotic. Push fluids. Follow-up with PCP. Notify us if not improving. Return for fu pcp. Seen By:  Abel Paredes MD      *Document was created using voice recognition software. Note was reviewed however may contain grammatical errors.

## 2022-08-29 ENCOUNTER — TELEPHONE (OUTPATIENT)
Dept: FAMILY MEDICINE CLINIC | Age: 42
End: 2022-08-29

## 2022-08-29 NOTE — TELEPHONE ENCOUNTER
Patient's chest/rib films are negative. Looks like they did not do the sternum x-ray that I ordered. Please check on this. If not done she should come back in and have this performed.

## 2022-08-29 NOTE — LETTER
[place] [id number]  Phone: [phone]  Fax: [phone]    Cori Jhaveri MD        September 7, 2022    [address]      Dear Razia Thayer:      I have been unable to reach you by phone, we have your test results. Please return our call at your earliest convenience. If you have any questions or concerns, please don't hesitate to call.     Sincerely,        Cori Jhaveri MD

## 2022-08-30 NOTE — TELEPHONE ENCOUNTER
Dr Lawyer Abdul nurse, Bruno Palumbo, said she was going to call the patient to come in for sternum images.

## 2022-09-01 ENCOUNTER — TELEPHONE (OUTPATIENT)
Dept: FAMILY MEDICINE CLINIC | Age: 42
End: 2022-09-01

## 2022-09-01 RX ORDER — AMOXICILLIN 250 MG/5ML
POWDER, FOR SUSPENSION ORAL
Qty: 300 ML | Refills: 0 | Status: SHIPPED
Start: 2022-09-01 | End: 2022-09-21

## 2022-09-01 NOTE — TELEPHONE ENCOUNTER
Patient called. Christine in [place] told the patient the Amoxicillin liquid antibiotic that was sent over was only written for 3 days and it needs to be resent for 10 to 14 days. Please advise.

## 2022-09-01 NOTE — TELEPHONE ENCOUNTER
I am not sure why they are calling now 6 days out if I only wrote a 3-day supply. What has she been doing? 7 Day supply  sent in.

## 2022-09-04 NOTE — TELEPHONE ENCOUNTER
Please try to contact her again see how she is doing. It looks like she did not log into [employer]. Make sure she got the message.

## 2022-09-07 NOTE — TELEPHONE ENCOUNTER
I tried to reach [place], but had to leave another voicemail to call the office. I tried to reach her  Anhsu Dunlap to get a message to her, but he did not answer and has no voicemail det up. I tried to reach her son Marimar Avila, but he did not answer and has no voicemail set up. At this point, should we send her a letter to call the office? What is your advice?

## 2022-11-11 ENCOUNTER — HOSPITAL ENCOUNTER (OUTPATIENT)
Age: 42
Discharge: HOME | End: 2022-11-13

## 2022-11-11 PROCEDURE — 88307 TISSUE EXAM BY PATHOLOGIST: CPT

## 2022-12-22 DIAGNOSIS — K21.9 GASTROESOPHAGEAL REFLUX DISEASE, UNSPECIFIED WHETHER ESOPHAGITIS PRESENT: ICD-10-CM

## 2022-12-22 RX ORDER — LANSOPRAZOLE 30 MG/1
30 CAPSULE, DELAYED RELEASE ORAL DAILY
Qty: 90 CAPSULE | Refills: 1 | Status: SHIPPED | OUTPATIENT
Start: 2022-12-22

## 2023-06-13 DIAGNOSIS — K21.9 GASTROESOPHAGEAL REFLUX DISEASE, UNSPECIFIED WHETHER ESOPHAGITIS PRESENT: ICD-10-CM

## 2023-06-13 NOTE — TELEPHONE ENCOUNTER
Left detailed message for patient to call our office to schedule an appointment with provider and at that time she will provide refills to get her to her appointment

## 2023-09-19 RX ORDER — LANSOPRAZOLE 30 MG/1
30 CAPSULE, DELAYED RELEASE ORAL DAILY
Qty: 90 CAPSULE | Refills: 1 | Status: SHIPPED | OUTPATIENT
Start: 2023-09-19

## 2023-09-22 ENCOUNTER — TELEPHONE (OUTPATIENT)
Dept: PRIMARY CARE CLINIC | Age: 43
End: 2023-09-22

## 2023-12-13 ENCOUNTER — HOSPITAL ENCOUNTER (OUTPATIENT)
Age: 43
Discharge: HOME | End: 2023-12-15

## 2024-02-12 ENCOUNTER — OFFICE VISIT (OUTPATIENT)
Dept: PRIMARY CARE CLINIC | Age: 44
End: 2024-02-12
Payer: COMMERCIAL

## 2024-02-12 VITALS
BODY MASS INDEX: 25.78 KG/M2 | HEIGHT: 64 IN | RESPIRATION RATE: 18 BRPM | HEART RATE: 84 BPM | TEMPERATURE: 98.2 F | SYSTOLIC BLOOD PRESSURE: 150 MMHG | DIASTOLIC BLOOD PRESSURE: 90 MMHG | WEIGHT: 151 LBS | OXYGEN SATURATION: 98 %

## 2024-02-12 DIAGNOSIS — R03.0 ELEVATED BLOOD PRESSURE READING: ICD-10-CM

## 2024-02-12 DIAGNOSIS — D06.9 HIGH GRADE SQUAMOUS INTRAEPITHELIAL LESION (HGSIL), GRADE 3 CIN, ON BIOPSY OF CERVIX: ICD-10-CM

## 2024-02-12 DIAGNOSIS — Z00.00 WELL WOMAN EXAM WITHOUT GYNECOLOGICAL EXAM: Primary | ICD-10-CM

## 2024-02-12 DIAGNOSIS — J06.9 VIRAL URI WITH COUGH: ICD-10-CM

## 2024-02-12 PROCEDURE — G8484 FLU IMMUNIZE NO ADMIN: HCPCS | Performed by: STUDENT IN AN ORGANIZED HEALTH CARE EDUCATION/TRAINING PROGRAM

## 2024-02-12 PROCEDURE — 99396 PREV VISIT EST AGE 40-64: CPT | Performed by: STUDENT IN AN ORGANIZED HEALTH CARE EDUCATION/TRAINING PROGRAM

## 2024-02-12 RX ORDER — BLOOD PRESSURE TEST KIT
KIT MISCELLANEOUS
Qty: 1 KIT | Refills: 0 | Status: SHIPPED | OUTPATIENT
Start: 2024-02-12

## 2024-02-12 SDOH — ECONOMIC STABILITY: FOOD INSECURITY: WITHIN THE PAST 12 MONTHS, YOU WORRIED THAT YOUR FOOD WOULD RUN OUT BEFORE YOU GOT MONEY TO BUY MORE.: NEVER TRUE

## 2024-02-12 SDOH — ECONOMIC STABILITY: HOUSING INSECURITY
IN THE LAST 12 MONTHS, WAS THERE A TIME WHEN YOU DID NOT HAVE A STEADY PLACE TO SLEEP OR SLEPT IN A SHELTER (INCLUDING NOW)?: NO

## 2024-02-12 SDOH — ECONOMIC STABILITY: INCOME INSECURITY: HOW HARD IS IT FOR YOU TO PAY FOR THE VERY BASICS LIKE FOOD, HOUSING, MEDICAL CARE, AND HEATING?: SOMEWHAT HARD

## 2024-02-12 SDOH — ECONOMIC STABILITY: FOOD INSECURITY: WITHIN THE PAST 12 MONTHS, THE FOOD YOU BOUGHT JUST DIDN'T LAST AND YOU DIDN'T HAVE MONEY TO GET MORE.: NEVER TRUE

## 2024-02-12 ASSESSMENT — PATIENT HEALTH QUESTIONNAIRE - PHQ9
SUM OF ALL RESPONSES TO PHQ QUESTIONS 1-9: 0
SUM OF ALL RESPONSES TO PHQ QUESTIONS 1-9: 0
2. FEELING DOWN, DEPRESSED OR HOPELESS: 0
SUM OF ALL RESPONSES TO PHQ9 QUESTIONS 1 & 2: 0
SUM OF ALL RESPONSES TO PHQ QUESTIONS 1-9: 0
1. LITTLE INTEREST OR PLEASURE IN DOING THINGS: 0
SUM OF ALL RESPONSES TO PHQ QUESTIONS 1-9: 0

## 2024-02-12 NOTE — PROGRESS NOTES
WELLNESS & HEALTH MAINTENANCE VISIT    24  Name: Charmaine Zavala   : 1980   Age: 43 y.o.  Sex: female        Assessment & Plan:   Charmaine was seen today for annual exam.    Diagnoses and all orders for this visit:    Well woman exam without gynecological exam    High grade squamous intraepithelial lesion (HGSIL), grade 3 MICA, on biopsy of cervix    Elevated blood pressure reading  -     Blood Pressure KIT; Use to check blood pressure 2-3 times per week.    Viral URI with cough      Health maintenance updated. Labs next visit.    Following with GYN for MICA 2-3. Considering hysterectomy.    Blood pressure elevated today and at recent GYN visits but notes stress as below. Provided order for home cuff. Advised to call if elevated. Consider medication next visit if remains elevated. Labs next visit.    Recommended Coricidin HBP for viral URI.    Counseled patient regarding above diagnosis, including possible risks and complications, especially if left uncontrolled.  Counseled patient as appropriate and relevant regarding any possible side effects, risks, and alternatives to treatment; the patient verbalizes understanding, and is in agreement with the plan as detailed above.   All educational materials and instructions were discussed and included on the After Visit Summary.  All questions answered to the patient's satisfaction.  The patient was advised to call or send edo message for any concerns prior to next appointment.    Return in about 3 months (around 2024) for follow-up.    Subjective:     Chief Complaint   Patient presents with    Annual Exam     Patient presents today for wellness visit.    Health Maintenance Due   Topic Date Due    Hepatitis B vaccine (1 of 3 - 3-dose series) Never done    COVID-19 Vaccine (1) Never done    Pneumococcal 0-64 years Vaccine (1 - PCV) Never done    Flu vaccine (1) Never done    DTaP/Tdap/Td vaccine (1 - Tdap) 11/10/2023      Diet: unhealthy  Exercise: not

## 2024-02-16 DIAGNOSIS — K21.9 GASTROESOPHAGEAL REFLUX DISEASE, UNSPECIFIED WHETHER ESOPHAGITIS PRESENT: ICD-10-CM

## 2024-02-16 RX ORDER — LANSOPRAZOLE 30 MG/1
30 CAPSULE, DELAYED RELEASE ORAL DAILY
Qty: 90 CAPSULE | Refills: 1 | Status: SHIPPED | OUTPATIENT
Start: 2024-02-16

## 2024-02-16 NOTE — TELEPHONE ENCOUNTER
Last Appointment:  2/12/2024  Future Appointments   Date Time Provider Department Center   4/15/2024  3:40 PM Jericho Scott MD [facility]   5/13/2024 11:30 AM [facility]

## 2024-07-19 ENCOUNTER — OFFICE VISIT (OUTPATIENT)
Dept: FAMILY MEDICINE CLINIC | Age: 44
End: 2024-07-19
Payer: COMMERCIAL

## 2024-07-19 VITALS
SYSTOLIC BLOOD PRESSURE: 140 MMHG | DIASTOLIC BLOOD PRESSURE: 78 MMHG | HEIGHT: 64 IN | HEART RATE: 121 BPM | OXYGEN SATURATION: 97 % | BODY MASS INDEX: 25.81 KG/M2 | WEIGHT: 151.2 LBS | TEMPERATURE: 97.7 F

## 2024-07-19 DIAGNOSIS — J01.40 ACUTE NON-RECURRENT PANSINUSITIS: Primary | ICD-10-CM

## 2024-07-19 PROCEDURE — G8419 CALC BMI OUT NRM PARAM NOF/U: HCPCS | Performed by: NURSE PRACTITIONER

## 2024-07-19 PROCEDURE — G8427 DOCREV CUR MEDS BY ELIG CLIN: HCPCS | Performed by: NURSE PRACTITIONER

## 2024-07-19 PROCEDURE — 4004F PT TOBACCO SCREEN RCVD TLK: CPT | Performed by: NURSE PRACTITIONER

## 2024-07-19 PROCEDURE — 99213 OFFICE O/P EST LOW 20 MIN: CPT | Performed by: NURSE PRACTITIONER

## 2024-07-19 RX ORDER — AMOXICILLIN 400 MG/5ML
875 POWDER, FOR SUSPENSION ORAL 2 TIMES DAILY
Qty: 152.6 ML | Refills: 0 | Status: SHIPPED | OUTPATIENT
Start: 2024-07-19 | End: 2024-07-26

## 2024-07-19 NOTE — PROGRESS NOTES
Content: Thought content normal.         Judgment: Judgment normal.           Lab / Imaging Results   (All laboratory and radiology results have been personally reviewed by myself)  Labs:  No results found for this visit on 07/19/24.    Imaging:  All Radiology results interpreted by Radiologist unless otherwise noted.  No results found.  Assessment/Plan  1. Acute non-recurrent pansinusitis  -     amoxicillin (AMOXIL) 400 MG/5ML suspension; Take 10.9 mLs by mouth 2 times daily for 7 days, Disp-152.6 mL, R-0Normal       Increase fluids and rest.   Other symptomatic relief discussed including Tylenol prn pain/fever. Schedule f/u with PCP in 7-10 days if symptoms persist.  Go to ED sooner if symptoms worsen or change. ED immediately with high or refractory fever, progressive SOB, dyspnea, CP, calf pain/swelling, shaking chills, vomiting, abdominal pain, lethargy, flank pain, or decreased urinary output. Pt verbalizes understanding and is in agreement with plan of care. All questions answered.    KAELA Guadarrama - NP    *NOTE: This report was transcribed using voice recognition software. Every effort was made to ensure accuracy; however, inadvertent computerized transcription errors may be present.

## 2024-09-05 DIAGNOSIS — K21.9 GASTROESOPHAGEAL REFLUX DISEASE, UNSPECIFIED WHETHER ESOPHAGITIS PRESENT: ICD-10-CM

## 2024-09-05 RX ORDER — LANSOPRAZOLE 30 MG/1
30 CAPSULE, DELAYED RELEASE ORAL DAILY
Qty: 90 CAPSULE | Refills: 0 | Status: SHIPPED | OUTPATIENT
Start: 2024-09-05

## 2024-12-09 ENCOUNTER — OFFICE VISIT (OUTPATIENT)
Dept: PRIMARY CARE CLINIC | Age: 44
End: 2024-12-09
Payer: COMMERCIAL

## 2024-12-09 VITALS
WEIGHT: 156.4 LBS | TEMPERATURE: 97.4 F | SYSTOLIC BLOOD PRESSURE: 148 MMHG | HEART RATE: 81 BPM | RESPIRATION RATE: 20 BRPM | BODY MASS INDEX: 26.7 KG/M2 | OXYGEN SATURATION: 98 % | DIASTOLIC BLOOD PRESSURE: 80 MMHG | HEIGHT: 64 IN

## 2024-12-09 DIAGNOSIS — Z00.00 ENCOUNTER FOR WELL ADULT EXAM WITHOUT ABNORMAL FINDINGS: Primary | ICD-10-CM

## 2024-12-09 DIAGNOSIS — K21.9 GASTROESOPHAGEAL REFLUX DISEASE, UNSPECIFIED WHETHER ESOPHAGITIS PRESENT: ICD-10-CM

## 2024-12-09 DIAGNOSIS — I10 ESSENTIAL HYPERTENSION: ICD-10-CM

## 2024-12-09 DIAGNOSIS — E66.3 OVERWEIGHT (BMI 25.0-29.9): ICD-10-CM

## 2024-12-09 DIAGNOSIS — E78.2 MIXED HYPERLIPIDEMIA: ICD-10-CM

## 2024-12-09 PROCEDURE — G8484 FLU IMMUNIZE NO ADMIN: HCPCS | Performed by: STUDENT IN AN ORGANIZED HEALTH CARE EDUCATION/TRAINING PROGRAM

## 2024-12-09 PROCEDURE — 3077F SYST BP >= 140 MM HG: CPT | Performed by: STUDENT IN AN ORGANIZED HEALTH CARE EDUCATION/TRAINING PROGRAM

## 2024-12-09 PROCEDURE — 3079F DIAST BP 80-89 MM HG: CPT | Performed by: STUDENT IN AN ORGANIZED HEALTH CARE EDUCATION/TRAINING PROGRAM

## 2024-12-09 PROCEDURE — 99396 PREV VISIT EST AGE 40-64: CPT | Performed by: STUDENT IN AN ORGANIZED HEALTH CARE EDUCATION/TRAINING PROGRAM

## 2024-12-09 RX ORDER — AMLODIPINE BESYLATE 5 MG/1
5 TABLET ORAL DAILY
Qty: 90 TABLET | Refills: 2 | Status: SHIPPED | OUTPATIENT
Start: 2024-12-09 | End: 2025-09-05

## 2024-12-09 RX ORDER — LANSOPRAZOLE 30 MG/1
30 CAPSULE, DELAYED RELEASE ORAL DAILY
Qty: 90 CAPSULE | Refills: 2 | Status: SHIPPED | OUTPATIENT
Start: 2024-12-09

## 2024-12-09 ASSESSMENT — ENCOUNTER SYMPTOMS
CONSTIPATION: 0
SHORTNESS OF BREATH: 0
ABDOMINAL PAIN: 0
WHEEZING: 0
NAUSEA: 0
VOMITING: 0

## 2024-12-09 NOTE — PROGRESS NOTES
Well Adult Note  Name: Charmaine Zavala Today’s Date: 2024   MRN: [id number] Sex: Female   Age: 44 y.o. Ethnicity:  /    : 1980 Race: White (non-)      Charmaine Zavala is here for a well adult exam.       Assessment & Plan   Encounter for well adult exam without abnormal findings  Gastroesophageal reflux disease, unspecified whether esophagitis present  Comments:  Stable.  Continue lansoprazole 30 mg daily.  Orders:  -     CBC; Future  -     Basic Metabolic Panel; Future  -     lansoprazole (PREVACID) 30 MG delayed release capsule; Take 1 capsule by mouth daily, Disp-90 capsule, R-2Normal  Mixed hyperlipidemia  Comments:  Will check lipid panel at this time.  Orders:  -     Lipid Panel; Future  Overweight (BMI 25.0-29.9)  -     CBC; Future  -     Basic Metabolic Panel; Future  Essential hypertension  Comments:  Unstable.  Begin course of Norvasc 5 mg daily.  Advised to have follow-up in 3 months if possible.  Orders:  -     amLODIPine (NORVASC) 5 MG tablet; Take 1 tablet by mouth daily, Disp-90 tablet, R-2Normal        No follow-ups on file.       Subjective   History:  Hypertension.  Patient has had several elevated blood pressures reading since 2024.  We did discuss starting a medication to help control her blood pressure.  Patient is unsure if she will start the medication or not.  I did encourage her to take the Norvasc as prescribed as this will help not only better control her blood pressure but may also help reduce risk of future heart, kidneys and/or free disease.    Patient additionally does suffer from GERD.  Overall symptoms being well-controlled with lansoprazole.    Review of Systems   Constitutional:  Negative for activity change, chills and fever.   Respiratory:  Negative for shortness of breath and wheezing.    Cardiovascular:  Negative for chest pain and palpitations.   Gastrointestinal:  Negative for abdominal pain, constipation, nausea and vomiting.

## 2024-12-13 DIAGNOSIS — K21.9 GASTROESOPHAGEAL REFLUX DISEASE, UNSPECIFIED WHETHER ESOPHAGITIS PRESENT: ICD-10-CM

## 2024-12-13 RX ORDER — LANSOPRAZOLE 30 MG/1
30 CAPSULE, DELAYED RELEASE ORAL DAILY
Qty: 90 CAPSULE | Refills: 2 | OUTPATIENT
Start: 2024-12-13

## 2025-05-30 ENCOUNTER — OFFICE VISIT (OUTPATIENT)
Dept: FAMILY MEDICINE CLINIC | Age: 45
End: 2025-05-30
Payer: COMMERCIAL

## 2025-05-30 VITALS
RESPIRATION RATE: 18 BRPM | TEMPERATURE: 98.9 F | OXYGEN SATURATION: 97 % | SYSTOLIC BLOOD PRESSURE: 128 MMHG | HEIGHT: 64 IN | HEART RATE: 91 BPM | BODY MASS INDEX: 26.29 KG/M2 | WEIGHT: 154 LBS | DIASTOLIC BLOOD PRESSURE: 82 MMHG

## 2025-05-30 DIAGNOSIS — H53.9 CHANGE IN VISION: ICD-10-CM

## 2025-05-30 DIAGNOSIS — R58 ECCHYMOSIS: ICD-10-CM

## 2025-05-30 DIAGNOSIS — J01.00 ACUTE NON-RECURRENT MAXILLARY SINUSITIS: Primary | ICD-10-CM

## 2025-05-30 DIAGNOSIS — R09.81 SINUS CONGESTION: ICD-10-CM

## 2025-05-30 PROCEDURE — 4004F PT TOBACCO SCREEN RCVD TLK: CPT | Performed by: NURSE PRACTITIONER

## 2025-05-30 PROCEDURE — G8427 DOCREV CUR MEDS BY ELIG CLIN: HCPCS | Performed by: NURSE PRACTITIONER

## 2025-05-30 PROCEDURE — 99214 OFFICE O/P EST MOD 30 MIN: CPT | Performed by: NURSE PRACTITIONER

## 2025-05-30 PROCEDURE — G8419 CALC BMI OUT NRM PARAM NOF/U: HCPCS | Performed by: NURSE PRACTITIONER

## 2025-05-30 RX ORDER — AMOXICILLIN 400 MG/5ML
875 POWDER, FOR SUSPENSION ORAL 2 TIMES DAILY
Qty: 218 ML | Refills: 0 | Status: SHIPPED | OUTPATIENT
Start: 2025-05-30 | End: 2025-06-09
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
